# Patient Record
Sex: MALE | Race: WHITE | Employment: OTHER | URBAN - METROPOLITAN AREA
[De-identification: names, ages, dates, MRNs, and addresses within clinical notes are randomized per-mention and may not be internally consistent; named-entity substitution may affect disease eponyms.]

---

## 2017-02-20 RX ORDER — METFORMIN HYDROCHLORIDE 500 MG/1
TABLET, EXTENDED RELEASE ORAL
Qty: 60 TABLET | Refills: 11 | Status: SHIPPED | OUTPATIENT
Start: 2017-02-20 | End: 2018-02-19 | Stop reason: SDUPTHER

## 2017-02-20 RX ORDER — GUAIFENESIN AND CODEINE PHOSPHATE 100; 10 MG/5ML; MG/5ML
SOLUTION ORAL
Qty: 120 ML | Refills: 0 | Status: SHIPPED | OUTPATIENT
Start: 2017-02-20 | End: 2018-10-04 | Stop reason: ALTCHOICE

## 2017-02-27 RX ORDER — ATORVASTATIN CALCIUM 40 MG/1
40 TABLET, FILM COATED ORAL DAILY
Qty: 90 TABLET | Refills: 3 | Status: SHIPPED | OUTPATIENT
Start: 2017-02-27 | End: 2018-04-05 | Stop reason: SDUPTHER

## 2017-03-23 RX ORDER — POTASSIUM CHLORIDE 1500 MG/1
TABLET, EXTENDED RELEASE ORAL
Qty: 30 TABLET | Refills: 3 | Status: SHIPPED | OUTPATIENT
Start: 2017-03-23 | End: 2017-05-17 | Stop reason: DRUGHIGH

## 2017-04-12 RX ORDER — LORAZEPAM 0.5 MG/1
TABLET ORAL
Qty: 100 TABLET | Refills: 0 | Status: SHIPPED | OUTPATIENT
Start: 2017-04-12 | End: 2017-07-19 | Stop reason: SDUPTHER

## 2017-04-21 ENCOUNTER — TELEPHONE (OUTPATIENT)
Dept: INTERNAL MEDICINE | Age: 72
End: 2017-04-21

## 2017-05-17 ENCOUNTER — OFFICE VISIT (OUTPATIENT)
Dept: INTERNAL MEDICINE | Age: 72
End: 2017-05-17

## 2017-05-17 VITALS
DIASTOLIC BLOOD PRESSURE: 68 MMHG | SYSTOLIC BLOOD PRESSURE: 138 MMHG | HEART RATE: 56 BPM | TEMPERATURE: 98.2 F | RESPIRATION RATE: 16 BRPM | BODY MASS INDEX: 25.5 KG/M2 | WEIGHT: 188 LBS

## 2017-05-17 DIAGNOSIS — I25.10 CORONARY ARTERY DISEASE INVOLVING NATIVE CORONARY ARTERY OF NATIVE HEART WITHOUT ANGINA PECTORIS: ICD-10-CM

## 2017-05-17 DIAGNOSIS — Z91.81 AT HIGH RISK FOR FALLS: ICD-10-CM

## 2017-05-17 DIAGNOSIS — J43.2 CENTRILOBULAR EMPHYSEMA (HCC): ICD-10-CM

## 2017-05-17 DIAGNOSIS — F41.1 GENERALIZED ANXIETY DISORDER: ICD-10-CM

## 2017-05-17 DIAGNOSIS — E78.2 MIXED HYPERLIPIDEMIA: ICD-10-CM

## 2017-05-17 DIAGNOSIS — I10 ESSENTIAL HYPERTENSION, BENIGN: ICD-10-CM

## 2017-05-17 DIAGNOSIS — E11.42 TYPE 2 DIABETES MELLITUS WITH PERIPHERAL NEUROPATHY (HCC): Primary | ICD-10-CM

## 2017-05-17 LAB
ALBUMIN SERPL-MCNC: 4.2 G/DL (ref 3.4–5)
ALP BLD-CCNC: 62 U/L (ref 40–129)
ALT SERPL-CCNC: 25 U/L (ref 10–40)
ANION GAP SERPL CALCULATED.3IONS-SCNC: 13 MMOL/L (ref 3–16)
AST SERPL-CCNC: 22 U/L (ref 15–37)
BASOPHILS ABSOLUTE: 0 K/UL (ref 0–0.2)
BASOPHILS RELATIVE PERCENT: 0.6 %
BILIRUB SERPL-MCNC: 0.7 MG/DL (ref 0–1)
BILIRUBIN DIRECT: <0.2 MG/DL (ref 0–0.3)
BILIRUBIN, INDIRECT: ABNORMAL MG/DL (ref 0–1)
BUN BLDV-MCNC: 20 MG/DL (ref 7–20)
CALCIUM SERPL-MCNC: 9.5 MG/DL (ref 8.3–10.6)
CHLORIDE BLD-SCNC: 101 MMOL/L (ref 99–110)
CHOLESTEROL, TOTAL: 137 MG/DL (ref 0–199)
CO2: 24 MMOL/L (ref 21–32)
CREAT SERPL-MCNC: 1 MG/DL (ref 0.8–1.3)
CREATININE URINE: 102.9 MG/DL (ref 39–259)
EOSINOPHILS ABSOLUTE: 0.1 K/UL (ref 0–0.6)
EOSINOPHILS RELATIVE PERCENT: 2.2 %
GFR AFRICAN AMERICAN: >60
GFR NON-AFRICAN AMERICAN: >60
GLUCOSE BLD-MCNC: 126 MG/DL (ref 70–99)
HCT VFR BLD CALC: 38.4 % (ref 40.5–52.5)
HDLC SERPL-MCNC: 49 MG/DL (ref 40–60)
HEMOGLOBIN: 12.6 G/DL (ref 13.5–17.5)
LDL CHOLESTEROL CALCULATED: 63 MG/DL
LYMPHOCYTES ABSOLUTE: 1.7 K/UL (ref 1–5.1)
LYMPHOCYTES RELATIVE PERCENT: 25.2 %
MCH RBC QN AUTO: 30.2 PG (ref 26–34)
MCHC RBC AUTO-ENTMCNC: 32.8 G/DL (ref 31–36)
MCV RBC AUTO: 92.3 FL (ref 80–100)
MICROALBUMIN UR-MCNC: <1.2 MG/DL
MICROALBUMIN/CREAT UR-RTO: NORMAL MG/G (ref 0–30)
MONOCYTES ABSOLUTE: 0.4 K/UL (ref 0–1.3)
MONOCYTES RELATIVE PERCENT: 6.7 %
NEUTROPHILS ABSOLUTE: 4.4 K/UL (ref 1.7–7.7)
NEUTROPHILS RELATIVE PERCENT: 65.3 %
PDW BLD-RTO: 14.7 % (ref 12.4–15.4)
PLATELET # BLD: 111 K/UL (ref 135–450)
PMV BLD AUTO: 10.9 FL (ref 5–10.5)
POTASSIUM SERPL-SCNC: 4.4 MMOL/L (ref 3.5–5.1)
RBC # BLD: 4.16 M/UL (ref 4.2–5.9)
SODIUM BLD-SCNC: 138 MMOL/L (ref 136–145)
TOTAL PROTEIN: 6.3 G/DL (ref 6.4–8.2)
TRIGL SERPL-MCNC: 126 MG/DL (ref 0–150)
VLDLC SERPL CALC-MCNC: 25 MG/DL
WBC # BLD: 6.7 K/UL (ref 4–11)

## 2017-05-17 PROCEDURE — 3023F SPIROM DOC REV: CPT | Performed by: INTERNAL MEDICINE

## 2017-05-17 PROCEDURE — G8926 SPIRO NO PERF OR DOC: HCPCS | Performed by: INTERNAL MEDICINE

## 2017-05-17 PROCEDURE — G8420 CALC BMI NORM PARAMETERS: HCPCS | Performed by: INTERNAL MEDICINE

## 2017-05-17 PROCEDURE — 99214 OFFICE O/P EST MOD 30 MIN: CPT | Performed by: INTERNAL MEDICINE

## 2017-05-17 PROCEDURE — 1036F TOBACCO NON-USER: CPT | Performed by: INTERNAL MEDICINE

## 2017-05-17 PROCEDURE — G8598 ASA/ANTIPLAT THER USED: HCPCS | Performed by: INTERNAL MEDICINE

## 2017-05-17 PROCEDURE — 3044F HG A1C LEVEL LT 7.0%: CPT | Performed by: INTERNAL MEDICINE

## 2017-05-17 PROCEDURE — 4040F PNEUMOC VAC/ADMIN/RCVD: CPT | Performed by: INTERNAL MEDICINE

## 2017-05-17 PROCEDURE — 36415 COLL VENOUS BLD VENIPUNCTURE: CPT | Performed by: INTERNAL MEDICINE

## 2017-05-17 PROCEDURE — G8427 DOCREV CUR MEDS BY ELIG CLIN: HCPCS | Performed by: INTERNAL MEDICINE

## 2017-05-17 PROCEDURE — 1123F ACP DISCUSS/DSCN MKR DOCD: CPT | Performed by: INTERNAL MEDICINE

## 2017-05-17 PROCEDURE — 3017F COLORECTAL CA SCREEN DOC REV: CPT | Performed by: INTERNAL MEDICINE

## 2017-05-17 RX ORDER — TAMSULOSIN HYDROCHLORIDE 0.4 MG/1
CAPSULE ORAL
COMMUNITY
End: 2017-05-17 | Stop reason: DRUGHIGH

## 2017-05-17 RX ORDER — POTASSIUM CHLORIDE 20 MEQ/1
TABLET, EXTENDED RELEASE ORAL
COMMUNITY
End: 2018-01-16 | Stop reason: SDUPTHER

## 2017-05-17 RX ORDER — FUROSEMIDE 20 MG/1
TABLET ORAL
COMMUNITY
End: 2017-10-14 | Stop reason: SDUPTHER

## 2017-05-17 ASSESSMENT — PATIENT HEALTH QUESTIONNAIRE - PHQ9
SUM OF ALL RESPONSES TO PHQ9 QUESTIONS 1 & 2: 0
1. LITTLE INTEREST OR PLEASURE IN DOING THINGS: 0
SUM OF ALL RESPONSES TO PHQ QUESTIONS 1-9: 0
2. FEELING DOWN, DEPRESSED OR HOPELESS: 0

## 2017-05-18 LAB
ESTIMATED AVERAGE GLUCOSE: 139.9 MG/DL
HBA1C MFR BLD: 6.5 %

## 2017-06-20 RX ORDER — LOSARTAN POTASSIUM 100 MG/1
TABLET ORAL
Qty: 90 TABLET | Refills: 1 | Status: SHIPPED | OUTPATIENT
Start: 2017-06-20 | End: 2017-12-20 | Stop reason: SDUPTHER

## 2017-07-27 ENCOUNTER — OFFICE VISIT (OUTPATIENT)
Dept: CARDIOLOGY CLINIC | Age: 72
End: 2017-07-27

## 2017-07-27 VITALS
DIASTOLIC BLOOD PRESSURE: 80 MMHG | SYSTOLIC BLOOD PRESSURE: 124 MMHG | WEIGHT: 184.8 LBS | HEART RATE: 63 BPM | BODY MASS INDEX: 25.06 KG/M2

## 2017-07-27 DIAGNOSIS — I10 ESSENTIAL HYPERTENSION, BENIGN: Primary | ICD-10-CM

## 2017-07-27 DIAGNOSIS — D69.6 THROMBOCYTOPENIA (HCC): ICD-10-CM

## 2017-07-27 DIAGNOSIS — I25.10 CORONARY ARTERY DISEASE INVOLVING NATIVE CORONARY ARTERY OF NATIVE HEART WITHOUT ANGINA PECTORIS: ICD-10-CM

## 2017-07-27 DIAGNOSIS — N52.9 VASCULOGENIC ERECTILE DYSFUNCTION, UNSPECIFIED VASCULOGENIC ERECTILE DYSFUNCTION TYPE: ICD-10-CM

## 2017-07-27 PROCEDURE — G8419 CALC BMI OUT NRM PARAM NOF/U: HCPCS | Performed by: INTERNAL MEDICINE

## 2017-07-27 PROCEDURE — G8598 ASA/ANTIPLAT THER USED: HCPCS | Performed by: INTERNAL MEDICINE

## 2017-07-27 PROCEDURE — G8427 DOCREV CUR MEDS BY ELIG CLIN: HCPCS | Performed by: INTERNAL MEDICINE

## 2017-07-27 PROCEDURE — 99214 OFFICE O/P EST MOD 30 MIN: CPT | Performed by: INTERNAL MEDICINE

## 2017-07-27 PROCEDURE — 1036F TOBACCO NON-USER: CPT | Performed by: INTERNAL MEDICINE

## 2017-07-27 PROCEDURE — 93000 ELECTROCARDIOGRAM COMPLETE: CPT | Performed by: INTERNAL MEDICINE

## 2017-07-27 PROCEDURE — 1123F ACP DISCUSS/DSCN MKR DOCD: CPT | Performed by: INTERNAL MEDICINE

## 2017-07-27 PROCEDURE — 4040F PNEUMOC VAC/ADMIN/RCVD: CPT | Performed by: INTERNAL MEDICINE

## 2017-07-27 PROCEDURE — 3017F COLORECTAL CA SCREEN DOC REV: CPT | Performed by: INTERNAL MEDICINE

## 2017-07-27 RX ORDER — SILDENAFIL 100 MG/1
100 TABLET, FILM COATED ORAL PRN
Qty: 10 TABLET | Refills: 5 | Status: SHIPPED | OUTPATIENT
Start: 2017-07-27 | End: 2019-10-01

## 2017-09-11 ENCOUNTER — OFFICE VISIT (OUTPATIENT)
Dept: INTERNAL MEDICINE | Age: 72
End: 2017-09-11

## 2017-09-11 VITALS
RESPIRATION RATE: 16 BRPM | DIASTOLIC BLOOD PRESSURE: 70 MMHG | HEART RATE: 60 BPM | WEIGHT: 187 LBS | BODY MASS INDEX: 25.36 KG/M2 | SYSTOLIC BLOOD PRESSURE: 132 MMHG | TEMPERATURE: 98.2 F

## 2017-09-11 DIAGNOSIS — E11.42 TYPE 2 DIABETES MELLITUS WITH PERIPHERAL NEUROPATHY (HCC): ICD-10-CM

## 2017-09-11 DIAGNOSIS — I10 ESSENTIAL HYPERTENSION, BENIGN: Primary | ICD-10-CM

## 2017-09-11 DIAGNOSIS — F41.1 GENERALIZED ANXIETY DISORDER: ICD-10-CM

## 2017-09-11 DIAGNOSIS — E78.2 MIXED HYPERLIPIDEMIA: ICD-10-CM

## 2017-09-11 DIAGNOSIS — I25.10 CORONARY ARTERY DISEASE INVOLVING NATIVE CORONARY ARTERY OF NATIVE HEART WITHOUT ANGINA PECTORIS: ICD-10-CM

## 2017-09-11 LAB
ALBUMIN SERPL-MCNC: 4.3 G/DL (ref 3.4–5)
ALP BLD-CCNC: 59 U/L (ref 40–129)
ALT SERPL-CCNC: 20 U/L (ref 10–40)
ANION GAP SERPL CALCULATED.3IONS-SCNC: 15 MMOL/L (ref 3–16)
AST SERPL-CCNC: 21 U/L (ref 15–37)
BASOPHILS ABSOLUTE: 0 K/UL (ref 0–0.2)
BASOPHILS RELATIVE PERCENT: 0.6 %
BILIRUB SERPL-MCNC: 0.6 MG/DL (ref 0–1)
BILIRUBIN DIRECT: <0.2 MG/DL (ref 0–0.3)
BILIRUBIN, INDIRECT: NORMAL MG/DL (ref 0–1)
BUN BLDV-MCNC: 16 MG/DL (ref 7–20)
CALCIUM SERPL-MCNC: 9.8 MG/DL (ref 8.3–10.6)
CHLORIDE BLD-SCNC: 102 MMOL/L (ref 99–110)
CHOLESTEROL, TOTAL: 164 MG/DL (ref 0–199)
CO2: 23 MMOL/L (ref 21–32)
CREAT SERPL-MCNC: 0.8 MG/DL (ref 0.8–1.3)
EOSINOPHILS ABSOLUTE: 0.1 K/UL (ref 0–0.6)
EOSINOPHILS RELATIVE PERCENT: 1.9 %
GFR AFRICAN AMERICAN: >60
GFR NON-AFRICAN AMERICAN: >60
GLUCOSE BLD-MCNC: 143 MG/DL (ref 70–99)
HCT VFR BLD CALC: 39.1 % (ref 40.5–52.5)
HDLC SERPL-MCNC: 49 MG/DL (ref 40–60)
HEMOGLOBIN: 13.4 G/DL (ref 13.5–17.5)
LDL CHOLESTEROL CALCULATED: 76 MG/DL
LYMPHOCYTES ABSOLUTE: 1.5 K/UL (ref 1–5.1)
LYMPHOCYTES RELATIVE PERCENT: 21.9 %
MCH RBC QN AUTO: 31.3 PG (ref 26–34)
MCHC RBC AUTO-ENTMCNC: 34.3 G/DL (ref 31–36)
MCV RBC AUTO: 91.2 FL (ref 80–100)
MONOCYTES ABSOLUTE: 0.5 K/UL (ref 0–1.3)
MONOCYTES RELATIVE PERCENT: 6.7 %
NEUTROPHILS ABSOLUTE: 4.9 K/UL (ref 1.7–7.7)
NEUTROPHILS RELATIVE PERCENT: 68.9 %
PDW BLD-RTO: 13.7 % (ref 12.4–15.4)
PLATELET # BLD: 115 K/UL (ref 135–450)
PMV BLD AUTO: 10.6 FL (ref 5–10.5)
POTASSIUM SERPL-SCNC: 4.2 MMOL/L (ref 3.5–5.1)
RBC # BLD: 4.29 M/UL (ref 4.2–5.9)
SODIUM BLD-SCNC: 140 MMOL/L (ref 136–145)
TOTAL PROTEIN: 6.7 G/DL (ref 6.4–8.2)
TRIGL SERPL-MCNC: 195 MG/DL (ref 0–150)
VLDLC SERPL CALC-MCNC: 39 MG/DL
WBC # BLD: 7.1 K/UL (ref 4–11)

## 2017-09-11 PROCEDURE — 36415 COLL VENOUS BLD VENIPUNCTURE: CPT | Performed by: INTERNAL MEDICINE

## 2017-09-11 PROCEDURE — 1036F TOBACCO NON-USER: CPT | Performed by: INTERNAL MEDICINE

## 2017-09-11 PROCEDURE — G8419 CALC BMI OUT NRM PARAM NOF/U: HCPCS | Performed by: INTERNAL MEDICINE

## 2017-09-11 PROCEDURE — G8427 DOCREV CUR MEDS BY ELIG CLIN: HCPCS | Performed by: INTERNAL MEDICINE

## 2017-09-11 PROCEDURE — 1123F ACP DISCUSS/DSCN MKR DOCD: CPT | Performed by: INTERNAL MEDICINE

## 2017-09-11 PROCEDURE — 3017F COLORECTAL CA SCREEN DOC REV: CPT | Performed by: INTERNAL MEDICINE

## 2017-09-11 PROCEDURE — G8598 ASA/ANTIPLAT THER USED: HCPCS | Performed by: INTERNAL MEDICINE

## 2017-09-11 PROCEDURE — 4040F PNEUMOC VAC/ADMIN/RCVD: CPT | Performed by: INTERNAL MEDICINE

## 2017-09-11 PROCEDURE — 3046F HEMOGLOBIN A1C LEVEL >9.0%: CPT | Performed by: INTERNAL MEDICINE

## 2017-09-11 PROCEDURE — 99214 OFFICE O/P EST MOD 30 MIN: CPT | Performed by: INTERNAL MEDICINE

## 2017-09-11 RX ORDER — LORAZEPAM 0.5 MG/1
0.5 TABLET ORAL EVERY 8 HOURS PRN
Qty: 100 TABLET | Refills: 0 | Status: SHIPPED | OUTPATIENT
Start: 2017-09-11 | End: 2017-10-23 | Stop reason: SDUPTHER

## 2017-09-12 LAB
ESTIMATED AVERAGE GLUCOSE: 139.9 MG/DL
HBA1C MFR BLD: 6.5 %

## 2017-10-16 RX ORDER — FUROSEMIDE 20 MG/1
TABLET ORAL
Qty: 30 TABLET | Refills: 5 | Status: SHIPPED | OUTPATIENT
Start: 2017-10-16 | End: 2018-04-16 | Stop reason: SDUPTHER

## 2017-10-18 RX ORDER — SITAGLIPTIN 100 MG/1
TABLET, FILM COATED ORAL
Qty: 30 TABLET | Refills: 2 | Status: SHIPPED | OUTPATIENT
Start: 2017-10-18 | End: 2018-03-04 | Stop reason: SDUPTHER

## 2017-10-23 ENCOUNTER — OFFICE VISIT (OUTPATIENT)
Dept: INTERNAL MEDICINE CLINIC | Age: 72
End: 2017-10-23

## 2017-10-23 VITALS
SYSTOLIC BLOOD PRESSURE: 128 MMHG | DIASTOLIC BLOOD PRESSURE: 66 MMHG | HEART RATE: 68 BPM | RESPIRATION RATE: 16 BRPM | WEIGHT: 191 LBS | BODY MASS INDEX: 27.35 KG/M2 | HEIGHT: 70 IN

## 2017-10-23 DIAGNOSIS — J43.2 CENTRILOBULAR EMPHYSEMA (HCC): ICD-10-CM

## 2017-10-23 DIAGNOSIS — E11.42 TYPE 2 DIABETES MELLITUS WITH PERIPHERAL NEUROPATHY (HCC): ICD-10-CM

## 2017-10-23 DIAGNOSIS — E78.2 MIXED HYPERLIPIDEMIA: ICD-10-CM

## 2017-10-23 DIAGNOSIS — K21.9 GASTROESOPHAGEAL REFLUX DISEASE WITHOUT ESOPHAGITIS: ICD-10-CM

## 2017-10-23 DIAGNOSIS — I10 ESSENTIAL HYPERTENSION, BENIGN: Primary | ICD-10-CM

## 2017-10-23 DIAGNOSIS — F41.1 GENERALIZED ANXIETY DISORDER: ICD-10-CM

## 2017-10-23 DIAGNOSIS — K58.0 IRRITABLE BOWEL SYNDROME WITH DIARRHEA: ICD-10-CM

## 2017-10-23 DIAGNOSIS — I25.10 CORONARY ARTERY DISEASE INVOLVING NATIVE CORONARY ARTERY OF NATIVE HEART WITHOUT ANGINA PECTORIS: ICD-10-CM

## 2017-10-23 PROCEDURE — G8427 DOCREV CUR MEDS BY ELIG CLIN: HCPCS | Performed by: INTERNAL MEDICINE

## 2017-10-23 PROCEDURE — G8926 SPIRO NO PERF OR DOC: HCPCS | Performed by: INTERNAL MEDICINE

## 2017-10-23 PROCEDURE — 4040F PNEUMOC VAC/ADMIN/RCVD: CPT | Performed by: INTERNAL MEDICINE

## 2017-10-23 PROCEDURE — 1036F TOBACCO NON-USER: CPT | Performed by: INTERNAL MEDICINE

## 2017-10-23 PROCEDURE — G8484 FLU IMMUNIZE NO ADMIN: HCPCS | Performed by: INTERNAL MEDICINE

## 2017-10-23 PROCEDURE — G8417 CALC BMI ABV UP PARAM F/U: HCPCS | Performed by: INTERNAL MEDICINE

## 2017-10-23 PROCEDURE — 3023F SPIROM DOC REV: CPT | Performed by: INTERNAL MEDICINE

## 2017-10-23 PROCEDURE — 90662 IIV NO PRSV INCREASED AG IM: CPT | Performed by: INTERNAL MEDICINE

## 2017-10-23 PROCEDURE — 99214 OFFICE O/P EST MOD 30 MIN: CPT | Performed by: INTERNAL MEDICINE

## 2017-10-23 PROCEDURE — 3017F COLORECTAL CA SCREEN DOC REV: CPT | Performed by: INTERNAL MEDICINE

## 2017-10-23 PROCEDURE — G0008 ADMIN INFLUENZA VIRUS VAC: HCPCS | Performed by: INTERNAL MEDICINE

## 2017-10-23 PROCEDURE — 1123F ACP DISCUSS/DSCN MKR DOCD: CPT | Performed by: INTERNAL MEDICINE

## 2017-10-23 PROCEDURE — G8598 ASA/ANTIPLAT THER USED: HCPCS | Performed by: INTERNAL MEDICINE

## 2017-10-23 PROCEDURE — 3044F HG A1C LEVEL LT 7.0%: CPT | Performed by: INTERNAL MEDICINE

## 2017-10-23 RX ORDER — LORAZEPAM 0.5 MG/1
0.5 TABLET ORAL EVERY 8 HOURS PRN
Qty: 90 TABLET | Refills: 2 | Status: SHIPPED | OUTPATIENT
Start: 2017-10-23 | End: 2018-04-16 | Stop reason: SDUPTHER

## 2017-10-23 ASSESSMENT — ENCOUNTER SYMPTOMS
COUGH: 0
GASTROINTESTINAL NEGATIVE: 1
RESPIRATORY NEGATIVE: 1
SHORTNESS OF BREATH: 0

## 2017-10-23 NOTE — PROGRESS NOTES
Vaccine Information Sheet, \"Influenza - Inactivated\"  given to Arline Lee, or parent/legal guardian of  Arline Lee and verbalized understanding. Patient responses:    Have you ever had a reaction to a flu vaccine? No  Are you able to eat eggs without adverse effects? Yes  Do you have any current illness? No  Have you ever had Guillian [place] Syndrome? No    Flu vaccine given per order. Please see immunization tab.

## 2017-10-23 NOTE — LETTER
and sex drive. They may cause problems with breathing, sleep apnea and reduced coughing, which are especially dangerous for patients with lung disease. Overdose or dangerous interactions with alcohol and other medications may occur, leading to death. Hyperalgesia may develop, in which patients receiving opioids for the treatment of pain may actually become more sensitive to certain painful stimuli, and in some cases, experience pain from ordinarily non-painful stimuli. Women between the ages of 14-53 who could become pregnant should carefully weigh the risks and benefits of opioids with their physicians, as these medications increase the risk of pregnancy complications, including miscarriage,  delivery and stillbirth. It is also possible for babies to be born addicted to opioids. Opioid dependence withdrawal symptoms may include; feelings of uneasiness, increased pain, irritability, belly pain, diarrhea, sweats and goose-flesh. Benzodiazepines and non-benzodiazepine sleep medications: These medications can lead to problems such as addiction/dependence, sedation, fatigue, lightheadedness, dizziness, incoordination, falls, depression, hallucinations, and impaired judgment, memory and concentration. The ability to drive and operate machinery may also be affected. Abnormal sleep-related behaviors have been reported, including sleep walking, driving, making telephone calls, eating, or having sex while not fully awake. These medications can suppress breathing and worsen sleep apnea, particularly when combined with alcohol or other sedating medications, potentially leading to death. Dependence withdrawal symptoms may include tremors, anxiety, hallucinations and seizures. Stimulants:  Common adverse effects include addiction/dependence, increased blood pressure and heart rate, decreased appetite, nausea, involuntary weight loss, insomnia, irritability, and headaches.   These · I develop significant side effects from the medication. · My behavior is inconsistent with the responsibilities outlined above, which may also result in my being prevented from receiving further care from this office. · Other:____________________________________________________________________    AGREEMENT:    I have read the above and have had all of my questions answered. For chronic disease management, I know that my symptoms can be managed with many types of treatments. A chronic medication trial may be part of my treatment, but I must be an active participant in my care. Medication therapy is only one part of my symptom management plan. In some cases, there may be limited scientific evidence to support the chronic use of certain medications to improve symptoms and daily function. Furthermore, in certain circumstances, there may be scientific information that suggests that use of chronic controlled substances may actually worsen my symptoms and increase my risk of unintentional death directly related to this medication therapy. I know that if my provider feels my risk from controlled medications is greater than my benefit, I will have my controlled substance medication(s) compassionately lowered or removed altogether. I agree to a controlled substance medication trial.      I further agree to allow this office to contact family or friends if there are concerns about my safety and use of the controlled medications. I have agreed to use the following medications above as instructed by my physician and as stated in this [address].      Patient Signature:  ______________________  Date:10/23/2017 or _____________    Provider Signature:______________________  Date:10/23/2017 or _____________

## 2017-10-23 NOTE — LETTER
· My behavior is inconsistent with the responsibilities outlined above, which may also result in my being prevented from receiving further care from this office. · Other:____________________________________________________________________    AGREEMENT:    I have read the above and have had all of my questions answered. For chronic disease management, I know that my symptoms can be managed with many types of treatments. A chronic medication trial may be part of my treatment, but I must be an active participant in my care. Medication therapy is only one part of my symptom management plan. In some cases, there may be limited scientific evidence to support the chronic use of certain medications to improve symptoms and daily function. Furthermore, in certain circumstances, there may be scientific information that suggests that use of chronic controlled substances may actually worsen my symptoms and increase my risk of unintentional death directly related to this medication therapy. I know that if my provider feels my risk from controlled medications is greater than my benefit, I will have my controlled substance medication(s) compassionately lowered or removed altogether. I agree to a controlled substance medication trial.      I further agree to allow this office to contact family or friends if there are concerns about my safety and use of the controlled medications. I have agreed to use the following medications above as instructed by my physician and as stated in this [address].      Patient Signature:  ______________________  Date:10/23/2017 or _____________    Provider Signature:______________________  Date:10/23/2017 or _____________

## 2017-12-20 RX ORDER — LOSARTAN POTASSIUM 100 MG/1
TABLET ORAL
Qty: 90 TABLET | Refills: 1 | Status: SHIPPED | OUTPATIENT
Start: 2017-12-20 | End: 2018-04-16 | Stop reason: SDUPTHER

## 2018-01-15 RX ORDER — POTASSIUM CHLORIDE 20 MEQ/1
20 TABLET, EXTENDED RELEASE ORAL DAILY
Qty: 60 TABLET | Refills: 3 | OUTPATIENT
Start: 2018-01-15

## 2018-01-19 RX ORDER — POTASSIUM CHLORIDE 20 MEQ/1
TABLET, EXTENDED RELEASE ORAL
Qty: 30 TABLET | Refills: 3 | Status: SHIPPED | OUTPATIENT
Start: 2018-01-19 | End: 2018-04-16 | Stop reason: SDUPTHER

## 2018-01-22 RX ORDER — POTASSIUM CHLORIDE 1500 MG/1
TABLET, EXTENDED RELEASE ORAL
Qty: 30 TABLET | Refills: 3 | OUTPATIENT
Start: 2018-01-22

## 2018-02-19 RX ORDER — METFORMIN HYDROCHLORIDE 500 MG/1
TABLET, EXTENDED RELEASE ORAL
Qty: 180 TABLET | Refills: 2 | Status: SHIPPED | OUTPATIENT
Start: 2018-02-19 | End: 2018-10-16 | Stop reason: SDUPTHER

## 2018-03-05 RX ORDER — SITAGLIPTIN 100 MG/1
TABLET, FILM COATED ORAL
Qty: 30 TABLET | Refills: 5 | Status: SHIPPED | OUTPATIENT
Start: 2018-03-05 | End: 2018-09-20 | Stop reason: SDUPTHER

## 2018-04-05 ENCOUNTER — OFFICE VISIT (OUTPATIENT)
Dept: CARDIOLOGY CLINIC | Age: 73
End: 2018-04-05

## 2018-04-05 VITALS
SYSTOLIC BLOOD PRESSURE: 150 MMHG | DIASTOLIC BLOOD PRESSURE: 92 MMHG | WEIGHT: 192 LBS | HEART RATE: 64 BPM | BODY MASS INDEX: 27.55 KG/M2

## 2018-04-05 DIAGNOSIS — E78.2 MIXED HYPERLIPIDEMIA: Primary | ICD-10-CM

## 2018-04-05 DIAGNOSIS — I25.10 CORONARY ARTERY DISEASE INVOLVING NATIVE CORONARY ARTERY OF NATIVE HEART WITHOUT ANGINA PECTORIS: ICD-10-CM

## 2018-04-05 DIAGNOSIS — I10 ESSENTIAL HYPERTENSION, BENIGN: ICD-10-CM

## 2018-04-05 PROCEDURE — 3017F COLORECTAL CA SCREEN DOC REV: CPT | Performed by: INTERNAL MEDICINE

## 2018-04-05 PROCEDURE — 99213 OFFICE O/P EST LOW 20 MIN: CPT | Performed by: INTERNAL MEDICINE

## 2018-04-05 PROCEDURE — 1123F ACP DISCUSS/DSCN MKR DOCD: CPT | Performed by: INTERNAL MEDICINE

## 2018-04-05 PROCEDURE — G8417 CALC BMI ABV UP PARAM F/U: HCPCS | Performed by: INTERNAL MEDICINE

## 2018-04-05 PROCEDURE — G8427 DOCREV CUR MEDS BY ELIG CLIN: HCPCS | Performed by: INTERNAL MEDICINE

## 2018-04-05 PROCEDURE — 1036F TOBACCO NON-USER: CPT | Performed by: INTERNAL MEDICINE

## 2018-04-05 PROCEDURE — G8598 ASA/ANTIPLAT THER USED: HCPCS | Performed by: INTERNAL MEDICINE

## 2018-04-05 PROCEDURE — 4040F PNEUMOC VAC/ADMIN/RCVD: CPT | Performed by: INTERNAL MEDICINE

## 2018-04-05 RX ORDER — ATORVASTATIN CALCIUM 40 MG/1
40 TABLET, FILM COATED ORAL DAILY
Qty: 90 TABLET | Refills: 3 | Status: SHIPPED | OUTPATIENT
Start: 2018-04-05 | End: 2019-04-04 | Stop reason: SDUPTHER

## 2018-04-13 ENCOUNTER — HOSPITAL ENCOUNTER (OUTPATIENT)
Dept: NON INVASIVE DIAGNOSTICS | Age: 73
Discharge: HOME | End: 2018-04-13
Attending: INTERNAL MEDICINE | Admitting: INTERNAL MEDICINE

## 2018-04-13 DIAGNOSIS — E78.2 MIXED HYPERLIPIDEMIA: ICD-10-CM

## 2018-04-13 LAB
A/G RATIO: 1.9 (ref 1.1–2.2)
ALBUMIN SERPL-MCNC: 4.1 G/DL (ref 3.4–5)
ALP BLD-CCNC: 64 U/L (ref 40–129)
ALT SERPL-CCNC: 30 U/L (ref 10–40)
ANION GAP SERPL CALCULATED.3IONS-SCNC: 14 MMOL/L (ref 3–16)
AST SERPL-CCNC: 23 U/L (ref 15–37)
BILIRUB SERPL-MCNC: 0.6 MG/DL (ref 0–1)
BUN BLDV-MCNC: 14 MG/DL (ref 7–20)
CALCIUM SERPL-MCNC: 9.4 MG/DL (ref 8.3–10.6)
CHLORIDE BLD-SCNC: 101 MMOL/L (ref 99–110)
CHOLESTEROL, TOTAL: 133 MG/DL (ref 0–199)
CO2: 26 MMOL/L (ref 21–32)
CREAT SERPL-MCNC: 0.8 MG/DL (ref 0.8–1.3)
GFR AFRICAN AMERICAN: >60
GFR NON-AFRICAN AMERICAN: >60
GLOBULIN: 2.2 G/DL
GLUCOSE BLD-MCNC: 166 MG/DL (ref 70–99)
HDLC SERPL-MCNC: 45 MG/DL (ref 40–60)
LDL CHOLESTEROL CALCULATED: 57 MG/DL
LV EF: 58 %
LVEF MODALITY: NORMAL
POTASSIUM SERPL-SCNC: 4.5 MMOL/L (ref 3.5–5.1)
SODIUM BLD-SCNC: 141 MMOL/L (ref 136–145)
TOTAL PROTEIN: 6.3 G/DL (ref 6.4–8.2)
TRIGL SERPL-MCNC: 153 MG/DL (ref 0–150)
VLDLC SERPL CALC-MCNC: 31 MG/DL

## 2018-04-16 ENCOUNTER — OFFICE VISIT (OUTPATIENT)
Dept: INTERNAL MEDICINE CLINIC | Age: 73
End: 2018-04-16

## 2018-04-16 VITALS
BODY MASS INDEX: 27.12 KG/M2 | DIASTOLIC BLOOD PRESSURE: 82 MMHG | HEART RATE: 60 BPM | SYSTOLIC BLOOD PRESSURE: 138 MMHG | WEIGHT: 189 LBS | RESPIRATION RATE: 16 BRPM | TEMPERATURE: 98.1 F

## 2018-04-16 DIAGNOSIS — J43.2 CENTRILOBULAR EMPHYSEMA (HCC): ICD-10-CM

## 2018-04-16 DIAGNOSIS — I10 ESSENTIAL HYPERTENSION, BENIGN: Primary | ICD-10-CM

## 2018-04-16 DIAGNOSIS — E11.42 TYPE 2 DIABETES MELLITUS WITH PERIPHERAL NEUROPATHY (HCC): ICD-10-CM

## 2018-04-16 DIAGNOSIS — I25.10 CORONARY ARTERY DISEASE INVOLVING NATIVE CORONARY ARTERY OF NATIVE HEART WITHOUT ANGINA PECTORIS: ICD-10-CM

## 2018-04-16 DIAGNOSIS — N40.1 BENIGN PROSTATIC HYPERPLASIA WITH NOCTURIA: ICD-10-CM

## 2018-04-16 DIAGNOSIS — R35.1 BENIGN PROSTATIC HYPERPLASIA WITH NOCTURIA: ICD-10-CM

## 2018-04-16 PROCEDURE — 1123F ACP DISCUSS/DSCN MKR DOCD: CPT | Performed by: INTERNAL MEDICINE

## 2018-04-16 PROCEDURE — G8926 SPIRO NO PERF OR DOC: HCPCS | Performed by: INTERNAL MEDICINE

## 2018-04-16 PROCEDURE — G8598 ASA/ANTIPLAT THER USED: HCPCS | Performed by: INTERNAL MEDICINE

## 2018-04-16 PROCEDURE — G8417 CALC BMI ABV UP PARAM F/U: HCPCS | Performed by: INTERNAL MEDICINE

## 2018-04-16 PROCEDURE — 3017F COLORECTAL CA SCREEN DOC REV: CPT | Performed by: INTERNAL MEDICINE

## 2018-04-16 PROCEDURE — 1036F TOBACCO NON-USER: CPT | Performed by: INTERNAL MEDICINE

## 2018-04-16 PROCEDURE — 4040F PNEUMOC VAC/ADMIN/RCVD: CPT | Performed by: INTERNAL MEDICINE

## 2018-04-16 PROCEDURE — 2022F DILAT RTA XM EVC RTNOPTHY: CPT | Performed by: INTERNAL MEDICINE

## 2018-04-16 PROCEDURE — G8427 DOCREV CUR MEDS BY ELIG CLIN: HCPCS | Performed by: INTERNAL MEDICINE

## 2018-04-16 PROCEDURE — 99214 OFFICE O/P EST MOD 30 MIN: CPT | Performed by: INTERNAL MEDICINE

## 2018-04-16 PROCEDURE — 3023F SPIROM DOC REV: CPT | Performed by: INTERNAL MEDICINE

## 2018-04-16 PROCEDURE — 3046F HEMOGLOBIN A1C LEVEL >9.0%: CPT | Performed by: INTERNAL MEDICINE

## 2018-04-16 RX ORDER — FUROSEMIDE 40 MG/1
TABLET ORAL
Qty: 30 TABLET | Refills: 3 | Status: SHIPPED | OUTPATIENT
Start: 2018-04-16 | End: 2018-05-25

## 2018-04-16 RX ORDER — LORAZEPAM 0.5 MG/1
0.5 TABLET ORAL EVERY 8 HOURS PRN
Qty: 90 TABLET | Refills: 2 | Status: SHIPPED | OUTPATIENT
Start: 2018-04-16 | End: 2018-05-16

## 2018-04-16 RX ORDER — POTASSIUM CHLORIDE 20 MEQ/1
TABLET, EXTENDED RELEASE ORAL
Qty: 60 TABLET | Refills: 3 | Status: SHIPPED | OUTPATIENT
Start: 2018-04-16 | End: 2018-08-09 | Stop reason: SDUPTHER

## 2018-04-16 RX ORDER — LOSARTAN POTASSIUM 100 MG/1
50 TABLET ORAL DAILY
Qty: 30 TABLET | Refills: 3 | Status: SHIPPED | OUTPATIENT
Start: 2018-04-16 | End: 2018-05-25 | Stop reason: ALTCHOICE

## 2018-04-16 RX ORDER — ALBUTEROL SULFATE 90 UG/1
2 AEROSOL, METERED RESPIRATORY (INHALATION) 4 TIMES DAILY
COMMUNITY
End: 2019-10-01

## 2018-04-16 RX ORDER — TAMSULOSIN HYDROCHLORIDE 0.4 MG/1
0.4 CAPSULE ORAL 2 TIMES DAILY
Qty: 60 CAPSULE | Refills: 2 | Status: SHIPPED | OUTPATIENT
Start: 2018-04-16 | End: 2018-05-25 | Stop reason: DRUGHIGH

## 2018-04-16 RX ORDER — DOXYCYCLINE HYCLATE 100 MG/1
100 CAPSULE ORAL 2 TIMES DAILY
COMMUNITY
End: 2019-04-04

## 2018-04-18 ASSESSMENT — ENCOUNTER SYMPTOMS
GASTROINTESTINAL NEGATIVE: 1
RESPIRATORY NEGATIVE: 1
SHORTNESS OF BREATH: 0
COUGH: 0

## 2018-05-25 ENCOUNTER — OFFICE VISIT (OUTPATIENT)
Dept: INTERNAL MEDICINE CLINIC | Age: 73
End: 2018-05-25

## 2018-05-25 VITALS
BODY MASS INDEX: 27.12 KG/M2 | SYSTOLIC BLOOD PRESSURE: 116 MMHG | RESPIRATION RATE: 16 BRPM | DIASTOLIC BLOOD PRESSURE: 68 MMHG | WEIGHT: 189 LBS | HEART RATE: 60 BPM

## 2018-05-25 DIAGNOSIS — I50.32 CHRONIC DIASTOLIC CHF (CONGESTIVE HEART FAILURE), NYHA CLASS 2 (HCC): Primary | ICD-10-CM

## 2018-05-25 DIAGNOSIS — H81.10 BENIGN PAROXYSMAL POSITIONAL VERTIGO, UNSPECIFIED LATERALITY: ICD-10-CM

## 2018-05-25 DIAGNOSIS — I10 ESSENTIAL HYPERTENSION, BENIGN: ICD-10-CM

## 2018-05-25 PROCEDURE — 4040F PNEUMOC VAC/ADMIN/RCVD: CPT | Performed by: INTERNAL MEDICINE

## 2018-05-25 PROCEDURE — G8417 CALC BMI ABV UP PARAM F/U: HCPCS | Performed by: INTERNAL MEDICINE

## 2018-05-25 PROCEDURE — 99213 OFFICE O/P EST LOW 20 MIN: CPT | Performed by: INTERNAL MEDICINE

## 2018-05-25 PROCEDURE — G8427 DOCREV CUR MEDS BY ELIG CLIN: HCPCS | Performed by: INTERNAL MEDICINE

## 2018-05-25 PROCEDURE — 3017F COLORECTAL CA SCREEN DOC REV: CPT | Performed by: INTERNAL MEDICINE

## 2018-05-25 PROCEDURE — G8598 ASA/ANTIPLAT THER USED: HCPCS | Performed by: INTERNAL MEDICINE

## 2018-05-25 PROCEDURE — 1036F TOBACCO NON-USER: CPT | Performed by: INTERNAL MEDICINE

## 2018-05-25 PROCEDURE — 1123F ACP DISCUSS/DSCN MKR DOCD: CPT | Performed by: INTERNAL MEDICINE

## 2018-05-25 RX ORDER — LOSARTAN POTASSIUM 25 MG/1
12.5 TABLET ORAL DAILY
Qty: 45 TABLET | Refills: 2 | Status: SHIPPED | OUTPATIENT
Start: 2018-05-25 | End: 2018-10-04 | Stop reason: SDUPTHER

## 2018-05-25 RX ORDER — FUROSEMIDE 20 MG/1
30 TABLET ORAL EVERY MORNING
Qty: 45 TABLET | Refills: 5 | Status: SHIPPED | OUTPATIENT
Start: 2018-05-25 | End: 2018-11-15 | Stop reason: SDUPTHER

## 2018-05-25 RX ORDER — TAMSULOSIN HYDROCHLORIDE 0.4 MG/1
0.4 CAPSULE ORAL DAILY
COMMUNITY
End: 2018-05-25 | Stop reason: SDUPTHER

## 2018-05-25 RX ORDER — TAMSULOSIN HYDROCHLORIDE 0.4 MG/1
0.4 CAPSULE ORAL 2 TIMES DAILY
Qty: 60 CAPSULE | Refills: 5 | Status: SHIPPED | OUTPATIENT
Start: 2018-05-25 | End: 2019-01-31 | Stop reason: SDUPTHER

## 2018-05-25 ASSESSMENT — PATIENT HEALTH QUESTIONNAIRE - PHQ9
SUM OF ALL RESPONSES TO PHQ9 QUESTIONS 1 & 2: 0
2. FEELING DOWN, DEPRESSED OR HOPELESS: 0
SUM OF ALL RESPONSES TO PHQ QUESTIONS 1-9: 0
1. LITTLE INTEREST OR PLEASURE IN DOING THINGS: 0

## 2018-06-01 PROBLEM — I50.32 CHRONIC DIASTOLIC CHF (CONGESTIVE HEART FAILURE), NYHA CLASS 2 (HCC): Status: ACTIVE | Noted: 2018-06-01

## 2018-06-01 ASSESSMENT — ENCOUNTER SYMPTOMS
GASTROINTESTINAL NEGATIVE: 1
RESPIRATORY NEGATIVE: 1
DIARRHEA: 0
VOMITING: 0
SHORTNESS OF BREATH: 0

## 2018-07-31 RX ORDER — LORAZEPAM 0.5 MG/1
0.5 TABLET ORAL EVERY 8 HOURS PRN
Qty: 90 TABLET | Refills: 2 | OUTPATIENT
Start: 2018-07-31 | End: 2018-08-30

## 2018-07-31 NOTE — TELEPHONE ENCOUNTER
NARX SCORES  Narcotic  000  Sedative  000  Stimulant  000  Explanation and Guidance  OVERDOSE RISK SCORE     000  (Range 000-999)  Explanation and Guidance    CANNOT PULL OAARS, no entries. Last script written 4/2018 for 90 with 2 refills, should've been out since mid-July and already weaned off if he was taking it around the clock, otherwise he is likely taking it only PRN as prescribed and has meds left, which he should not be dependent on it. Again, by reviewing chart >10 minutes, no baseline anxiety control SSRI onboard, rather he is taking short-acting benzo that is habit-formin and dependence -inducing. Workload: thus far 0.25 hours of administrative time involved with clinical but non-productive (global care) and non-reimbursed physician time. Based [place] RV[place] [id number] per 15 min visit of 1 = 1 RVU not reimbursed.

## 2018-08-09 RX ORDER — POTASSIUM CHLORIDE 20 MEQ/1
TABLET, EXTENDED RELEASE ORAL
Qty: 60 TABLET | Refills: 0 | Status: SHIPPED | OUTPATIENT
Start: 2018-08-09 | End: 2018-09-05 | Stop reason: SDUPTHER

## 2018-09-10 RX ORDER — POTASSIUM CHLORIDE 1500 MG/1
TABLET, EXTENDED RELEASE ORAL
Qty: 60 TABLET | Refills: 0 | Status: SHIPPED | OUTPATIENT
Start: 2018-09-10 | End: 2018-10-16 | Stop reason: SDUPTHER

## 2018-09-20 NOTE — TELEPHONE ENCOUNTER
Former Dr Lisset Miller pt   Establishing with Dr Madhav Sullivan on 10/4/18  Please advised as DOD  Thank you

## 2018-10-04 ENCOUNTER — OFFICE VISIT (OUTPATIENT)
Dept: INTERNAL MEDICINE CLINIC | Age: 73
End: 2018-10-04
Payer: MEDICARE

## 2018-10-04 ENCOUNTER — OFFICE VISIT (OUTPATIENT)
Dept: CARDIOLOGY CLINIC | Age: 73
End: 2018-10-04
Payer: MEDICARE

## 2018-10-04 VITALS
BODY MASS INDEX: 26.03 KG/M2 | DIASTOLIC BLOOD PRESSURE: 64 MMHG | WEIGHT: 184 LBS | SYSTOLIC BLOOD PRESSURE: 120 MMHG | HEART RATE: 64 BPM

## 2018-10-04 VITALS
DIASTOLIC BLOOD PRESSURE: 80 MMHG | BODY MASS INDEX: 25.76 KG/M2 | HEART RATE: 72 BPM | WEIGHT: 184 LBS | SYSTOLIC BLOOD PRESSURE: 136 MMHG | TEMPERATURE: 98.1 F | RESPIRATION RATE: 16 BRPM | HEIGHT: 71 IN

## 2018-10-04 DIAGNOSIS — E11.42 TYPE 2 DIABETES MELLITUS WITH PERIPHERAL NEUROPATHY (HCC): Primary | ICD-10-CM

## 2018-10-04 DIAGNOSIS — I50.32 CHRONIC DIASTOLIC CHF (CONGESTIVE HEART FAILURE), NYHA CLASS 2 (HCC): ICD-10-CM

## 2018-10-04 DIAGNOSIS — E11.42 TYPE 2 DIABETES MELLITUS WITH PERIPHERAL NEUROPATHY (HCC): ICD-10-CM

## 2018-10-04 DIAGNOSIS — F51.02 ADJUSTMENT INSOMNIA: ICD-10-CM

## 2018-10-04 DIAGNOSIS — I10 ESSENTIAL HYPERTENSION, BENIGN: ICD-10-CM

## 2018-10-04 DIAGNOSIS — E78.2 MIXED HYPERLIPIDEMIA: Primary | ICD-10-CM

## 2018-10-04 DIAGNOSIS — I25.10 CORONARY ARTERY DISEASE INVOLVING NATIVE CORONARY ARTERY OF NATIVE HEART WITHOUT ANGINA PECTORIS: ICD-10-CM

## 2018-10-04 DIAGNOSIS — E78.2 MIXED HYPERLIPIDEMIA: ICD-10-CM

## 2018-10-04 LAB
A/G RATIO: 1.9 (ref 1.1–2.2)
ALBUMIN SERPL-MCNC: 4.4 G/DL (ref 3.4–5)
ALP BLD-CCNC: 59 U/L (ref 40–129)
ALT SERPL-CCNC: 19 U/L (ref 10–40)
ANION GAP SERPL CALCULATED.3IONS-SCNC: 13 MMOL/L (ref 3–16)
AST SERPL-CCNC: 23 U/L (ref 15–37)
BILIRUB SERPL-MCNC: 0.7 MG/DL (ref 0–1)
BUN BLDV-MCNC: 15 MG/DL (ref 7–20)
CALCIUM SERPL-MCNC: 9.7 MG/DL (ref 8.3–10.6)
CHLORIDE BLD-SCNC: 103 MMOL/L (ref 99–110)
CO2: 26 MMOL/L (ref 21–32)
CREAT SERPL-MCNC: 0.9 MG/DL (ref 0.8–1.3)
CREATININE URINE: 211.9 MG/DL (ref 39–259)
GFR AFRICAN AMERICAN: >60
GFR NON-AFRICAN AMERICAN: >60
GLOBULIN: 2.3 G/DL
GLUCOSE BLD-MCNC: 135 MG/DL (ref 70–99)
MICROALBUMIN UR-MCNC: 3 MG/DL
MICROALBUMIN/CREAT UR-RTO: 14.2 MG/G (ref 0–30)
POTASSIUM SERPL-SCNC: 4.5 MMOL/L (ref 3.5–5.1)
SODIUM BLD-SCNC: 142 MMOL/L (ref 136–145)
TOTAL PROTEIN: 6.7 G/DL (ref 6.4–8.2)

## 2018-10-04 PROCEDURE — G8427 DOCREV CUR MEDS BY ELIG CLIN: HCPCS | Performed by: INTERNAL MEDICINE

## 2018-10-04 PROCEDURE — 1101F PT FALLS ASSESS-DOCD LE1/YR: CPT | Performed by: INTERNAL MEDICINE

## 2018-10-04 PROCEDURE — 3017F COLORECTAL CA SCREEN DOC REV: CPT | Performed by: INTERNAL MEDICINE

## 2018-10-04 PROCEDURE — G8598 ASA/ANTIPLAT THER USED: HCPCS | Performed by: INTERNAL MEDICINE

## 2018-10-04 PROCEDURE — G0008 ADMIN INFLUENZA VIRUS VAC: HCPCS | Performed by: INTERNAL MEDICINE

## 2018-10-04 PROCEDURE — 90662 IIV NO PRSV INCREASED AG IM: CPT | Performed by: INTERNAL MEDICINE

## 2018-10-04 PROCEDURE — 4040F PNEUMOC VAC/ADMIN/RCVD: CPT | Performed by: INTERNAL MEDICINE

## 2018-10-04 PROCEDURE — 99214 OFFICE O/P EST MOD 30 MIN: CPT | Performed by: INTERNAL MEDICINE

## 2018-10-04 PROCEDURE — 1123F ACP DISCUSS/DSCN MKR DOCD: CPT | Performed by: INTERNAL MEDICINE

## 2018-10-04 PROCEDURE — G8482 FLU IMMUNIZE ORDER/ADMIN: HCPCS | Performed by: INTERNAL MEDICINE

## 2018-10-04 PROCEDURE — 3046F HEMOGLOBIN A1C LEVEL >9.0%: CPT | Performed by: INTERNAL MEDICINE

## 2018-10-04 PROCEDURE — 1036F TOBACCO NON-USER: CPT | Performed by: INTERNAL MEDICINE

## 2018-10-04 PROCEDURE — 99213 OFFICE O/P EST LOW 20 MIN: CPT | Performed by: INTERNAL MEDICINE

## 2018-10-04 PROCEDURE — G8417 CALC BMI ABV UP PARAM F/U: HCPCS | Performed by: INTERNAL MEDICINE

## 2018-10-04 PROCEDURE — 2022F DILAT RTA XM EVC RTNOPTHY: CPT | Performed by: INTERNAL MEDICINE

## 2018-10-04 RX ORDER — AMITRIPTYLINE HYDROCHLORIDE 25 MG/1
25 TABLET, FILM COATED ORAL NIGHTLY
Qty: 30 TABLET | Refills: 3 | Status: SHIPPED | OUTPATIENT
Start: 2018-10-04 | End: 2019-04-04

## 2018-10-04 RX ORDER — LOSARTAN POTASSIUM 100 MG/1
100 TABLET ORAL DAILY
Qty: 30 TABLET | Refills: 2 | Status: SHIPPED | OUTPATIENT
Start: 2018-10-04 | End: 2018-12-17 | Stop reason: SDUPTHER

## 2018-10-04 RX ORDER — KETOCONAZOLE 20 MG/ML
SHAMPOO TOPICAL
Qty: 60 ML | Refills: 1 | Status: SHIPPED | OUTPATIENT
Start: 2018-10-04 | End: 2019-10-01 | Stop reason: SDUPTHER

## 2018-10-04 NOTE — PROGRESS NOTES
Subjective:      Patient ID: Pillo Mckay is a 68 y.o. male. CC:  Follow up CAD    HPI Levon Sweet is s/p CABG and is intending to play golf this year. No cp nor syncope. No NTG use. Allergies   Allergen Reactions    Lisinopril Other (See Comments)     cough        Social History     Social History    Marital status:      Spouse name: Shannon Leblanc Number of children: 0    Years of education: 25     Occupational History    - Retired       Social History Main Topics    Smoking status: Former Smoker     Types: Cigarettes     Quit date: 1/1/1983    Smokeless tobacco: Never Used      Comment: ~40 pack years    Alcohol use No    Drug use: No    Sexual activity: Yes     Partners: Female     Other Topics Concern    Not on file     Social History Narrative    No narrative on file        Patient has a family history includes Colon Cancer in his father; Coronary Art Dis in his brother and brother; Other in his mother. Patient  has a past medical history of CAD (coronary artery disease); Colitis; Diabetes mellitus, type 2 ([address] Diabetic peripheral neuropathy ([address] Emphysema; Esophageal reflux; Essential hypertension, benign; Hyperlipidemia; Impaired fasting glucose; Impotence of organic origin; Insomnia, unspecified; Irritable bowel syndrome; Nephrolithiasis; Psoriasis; Pulmonary nodules; and Thrombocytopenia, unspecified ([address] Current Outpatient Prescriptions   Medication Sig Dispense Refill    ketoconazole (NIZORAL) 2 % shampoo Apply topically daily as needed.  60 mL 1    losartan (COZAAR) 100 MG tablet Take 1 tablet by mouth daily 30 tablet 2    amitriptyline (ELAVIL) 25 MG tablet Take 1 tablet by mouth nightly 30 tablet 3    SITagliptin (JANUVIA) 100 MG tablet TAKE 1 TABLET BY MOUTH EVERY DAY 30 tablet 0    KLOR-CON M20 20 MEQ extended release tablet TAKE 2 TABLET BY MOUTH EVERY DAY (Patient taking differently: TAKE 1 TABLET BY MOUTH TWICE DAILY) 60 tablet 0    tamsulosin (FLOMAX) 0.4 MG capsule Take 1 capsule by mouth 2 times daily 60 capsule 5    furosemide (LASIX) 20 MG tablet Take 1.5 tablets by mouth every morning 45 tablet 5    albuterol sulfate  (90 Base) MCG/ACT inhaler Inhale 2 puffs into the lungs 4 times daily      doxycycline hyclate (VIBRAMYCIN) 100 MG capsule Take 100 mg by mouth 2 times daily      zoster recombinant adjuvanted vaccine (SHINGRIX) 50 MCG SUSR injection Inject 0.5 ml intramuscularly now and repeat dose in 2 months 1 each 1    atorvastatin (LIPITOR) 40 MG tablet Take 1 tablet by mouth daily (Patient taking differently: Take 20 mg by mouth daily ) 90 tablet 3    metoprolol tartrate (LOPRESSOR) 25 MG tablet TAKE 1/2 A TAB BY MOUTH TWICE A DAY 60 tablet 3    metFORMIN (GLUCOPHAGE-XR) 500 MG extended release tablet TAKE 1 TABLET BY MOUTH TWICE A DAY WITH FOOD 180 tablet 2    sildenafil (VIAGRA) 100 MG tablet Take 1 tablet by mouth as needed for Erectile Dysfunction 10 tablet 5    Cholecalciferol (VITAMIN D) 2000 UNITS CAPS capsule Take by mouth      Fexofenadine HCl (ALLEGRA ALLERGY PO) Take 1 tablet by mouth daily      Omeprazole-Sodium Bicarbonate (ZEGERID OTC PO) 1 capsule by mouth at bedtime.  Pyridoxine HCl (VITAMIN B-6) 50 MG tablet Take 50 mg by mouth daily      aspirin 81 MG tablet Take 81 mg by mouth daily      fluticasone (FLONASE) 50 MCG/ACT nasal spray INSTILL 2 SPRAYS INTO EACH NOSTRIL DAILY FOR SINUS CONGESTION 3 Bottle 3    hyoscyamine (LEVSIN/SL) 0.125 MG SL tablet Take 1 tablet by mouth every 4 hours as needed (Abdominal bloating). 100 tablet 6    halcinonide (HALOG) 0.1 % CREA Apply to rash twice a day as needed. 60 g 1    therapeutic multivitamin-minerals (THERAGRAN-M) tablet Take 1 tablet by mouth daily. No current facility-administered medications for this visit. Vitals  Weight: 184 lb (83.5 kg)  Blood Pressure: BP: (120-136)/(64-80)    Pulse: 64            Diagnosis Orders   1.  Mixed hyperlipidemia     2. Chronic diastolic CHF (congestive heart failure), NYHA class 2 ([address]     3. Coronary artery disease involving native coronary artery of native heart without angina pectoris     4. Hypertension, benign            Review of Systems   Constitutional: Negative. HENT: Negative. Respiratory: Negative. Cardiovascular: Negative. Gastrointestinal: Negative. Genitourinary: Negative. Musculoskeletal: Negative. Skin: Negative. Allergic/Immunologic: Negative. Neurological: Negative. Hematological: Negative. Psychiatric/Behavioral: Negative. Objective:   Physical Exam   Constitutional: He is oriented to person, place, and time. He appears well-developed and well-nourished. HENT:   Head: Normocephalic and atraumatic. Eyes: Conjunctivae and EOM are normal. Pupils are equal, round, and reactive to light. Neck: Normal range of motion. Neck supple. Cardiovascular: Normal rate and regular rhythm. Exam reveals no friction rub. II/VI HS murmur heard. Pulmonary/Chest: Effort normal and breath sounds normal.   Abdominal: Soft. Bowel sounds are normal.   Musculoskeletal: Normal range of motion. He exhibits no edema or tenderness. Neurological: He is alert and oriented to person, place, and time. No cranial nerve deficit. Coordination normal.   Skin: Skin is warm. No rash noted. No erythema. No pallor. Psychiatric: He has a normal mood and affect. His behavior is normal. Judgment and thought content normal.       Assessment:      Atypical chest pain. Plan:      Continue current medications. Stable cardiovascular status after 5 v CABG on 2/1/16. Doing well. sr with old RBBB. SOB:  Better with bp control  Palpitations:  Continue lopressor.

## 2018-10-04 NOTE — PROGRESS NOTES
PROGRESS NOTE:    Cory Belcher    10/5/2018    Chief Complaint   Patient presents with   Alisha Marquis Patient     Former Dr. Merle Albright patient. HPI:    Mr(s)Woodrow Belcher presents to clinic today with issues noted above. Patient is taking BP medications at home without side effects, BP is being checked at home, states 180's up to at times @ home. Patient is tolerating anti-lipid meds such as statin without complications, no myalgia. Patient is taking DM meds as prescribed and is  checking sugars at home, denies hypoglycemia. He was asking about his Ativan, it was denied for prn use for insomnia 30 days prior and he has been out of meds. Patient denies chest pain, SOB, NVD, FC, rash, malaise, rigor, dizziness/lightheadness, other pertinent ROS was also reviewed. /80 (Site: Right Upper Arm, Position: Sitting, Cuff Size: Medium Adult)   Pulse 72 Comment: Regular  Temp 98.1 °F (36.7 °C) (Oral)   Resp 16   Ht 5' 10.5\" (1.791 m)   Wt 184 lb (83.5 kg)   BMI 26.03 kg/m²   Body mass index is 26.03 kg/m². Allergies   Allergen Reactions    Lisinopril Other (See Comments)     cough     Physical Exam:    Gen: Patient appears well groomed, well appearing  HEAD: Atraumatic, normocephalic,   Eyes: PERRLA, EOMI   Neck: supple, no thyroid nodule appreciated, no JVD  Chest: Clear to auscultation ROMANA, unlabored breathing, normal expansion  Heart: Regular rate, regular rhythm, no murmur, no rub  Abdomen: Non-tender, non-distended, bowel sounds present x3  Extremities: no edema, distal pulses intact  Patient was alert and oriented to person, place and time  DM foot exam: neg for any open lesions, dry, sensory test with good pinpoint distinction from plantar surface of toes to dorsum ROMANA up to mid shins ROMANA     Assessment and Plan:    Aurelia Codey was seen today for new patient. Diagnoses and all orders for this visit:    Type 2 diabetes mellitus with peripheral neuropathy ([address]  Controlled?   Last A1c was ~1 year

## 2018-10-05 LAB
ESTIMATED AVERAGE GLUCOSE: 134.1 MG/DL
HBA1C MFR BLD: 6.3 %

## 2018-10-16 RX ORDER — SITAGLIPTIN 100 MG/1
TABLET, FILM COATED ORAL
Qty: 30 TABLET | Refills: 0 | Status: SHIPPED | OUTPATIENT
Start: 2018-10-16 | End: 2018-11-17 | Stop reason: SDUPTHER

## 2018-10-16 RX ORDER — POTASSIUM CHLORIDE 1500 MG/1
TABLET, EXTENDED RELEASE ORAL
Qty: 60 TABLET | Refills: 0 | Status: SHIPPED | OUTPATIENT
Start: 2018-10-16 | End: 2018-11-29 | Stop reason: SDUPTHER

## 2018-10-16 RX ORDER — METFORMIN HYDROCHLORIDE 500 MG/1
TABLET, EXTENDED RELEASE ORAL
Qty: 180 TABLET | Refills: 2 | Status: SHIPPED | OUTPATIENT
Start: 2018-10-16 | End: 2019-04-04 | Stop reason: SDUPTHER

## 2018-11-16 RX ORDER — FUROSEMIDE 20 MG/1
30 TABLET ORAL EVERY MORNING
Qty: 45 TABLET | Refills: 5 | Status: SHIPPED | OUTPATIENT
Start: 2018-11-16 | End: 2019-04-04 | Stop reason: SDUPTHER

## 2018-11-29 RX ORDER — POTASSIUM CHLORIDE 20 MEQ/1
TABLET, EXTENDED RELEASE ORAL
Qty: 60 TABLET | Refills: 2 | Status: SHIPPED | OUTPATIENT
Start: 2018-11-29 | End: 2019-02-28 | Stop reason: SDUPTHER

## 2019-02-02 RX ORDER — TAMSULOSIN HYDROCHLORIDE 0.4 MG/1
0.4 CAPSULE ORAL 2 TIMES DAILY
Qty: 60 CAPSULE | Refills: 2 | Status: SHIPPED | OUTPATIENT
Start: 2019-02-02 | End: 2019-04-04 | Stop reason: SDUPTHER

## 2019-02-28 RX ORDER — POTASSIUM CHLORIDE 20 MEQ/1
TABLET, EXTENDED RELEASE ORAL
Qty: 60 TABLET | Refills: 0 | Status: SHIPPED | OUTPATIENT
Start: 2019-02-28 | End: 2019-04-04 | Stop reason: SDUPTHER

## 2019-03-04 ENCOUNTER — OFFICE VISIT (OUTPATIENT)
Dept: SLEEP MEDICINE | Age: 74
End: 2019-03-04
Payer: MEDICARE

## 2019-03-04 VITALS
WEIGHT: 188.8 LBS | RESPIRATION RATE: 16 BRPM | TEMPERATURE: 97 F | OXYGEN SATURATION: 96 % | SYSTOLIC BLOOD PRESSURE: 155 MMHG | DIASTOLIC BLOOD PRESSURE: 96 MMHG | BODY MASS INDEX: 26.43 KG/M2 | HEART RATE: 55 BPM | HEIGHT: 71 IN

## 2019-03-04 DIAGNOSIS — F51.5 NIGHTMARES REM-SLEEP TYPE: Primary | ICD-10-CM

## 2019-03-04 DIAGNOSIS — G47.19 EXCESSIVE DAYTIME SLEEPINESS: ICD-10-CM

## 2019-03-04 DIAGNOSIS — G47.33 OSA (OBSTRUCTIVE SLEEP APNEA): ICD-10-CM

## 2019-03-04 PROCEDURE — G8598 ASA/ANTIPLAT THER USED: HCPCS | Performed by: PSYCHIATRY & NEUROLOGY

## 2019-03-04 PROCEDURE — 1123F ACP DISCUSS/DSCN MKR DOCD: CPT | Performed by: PSYCHIATRY & NEUROLOGY

## 2019-03-04 PROCEDURE — 1036F TOBACCO NON-USER: CPT | Performed by: PSYCHIATRY & NEUROLOGY

## 2019-03-04 PROCEDURE — G8417 CALC BMI ABV UP PARAM F/U: HCPCS | Performed by: PSYCHIATRY & NEUROLOGY

## 2019-03-04 PROCEDURE — 4040F PNEUMOC VAC/ADMIN/RCVD: CPT | Performed by: PSYCHIATRY & NEUROLOGY

## 2019-03-04 PROCEDURE — G8427 DOCREV CUR MEDS BY ELIG CLIN: HCPCS | Performed by: PSYCHIATRY & NEUROLOGY

## 2019-03-04 PROCEDURE — 3017F COLORECTAL CA SCREEN DOC REV: CPT | Performed by: PSYCHIATRY & NEUROLOGY

## 2019-03-04 PROCEDURE — G8482 FLU IMMUNIZE ORDER/ADMIN: HCPCS | Performed by: PSYCHIATRY & NEUROLOGY

## 2019-03-04 PROCEDURE — 1101F PT FALLS ASSESS-DOCD LE1/YR: CPT | Performed by: PSYCHIATRY & NEUROLOGY

## 2019-03-04 PROCEDURE — 99204 OFFICE O/P NEW MOD 45 MIN: CPT | Performed by: PSYCHIATRY & NEUROLOGY

## 2019-03-04 ASSESSMENT — SLEEP AND FATIGUE QUESTIONNAIRES
HOW LIKELY ARE YOU TO NOD OFF OR FALL ASLEEP WHEN YOU ARE A PASSENGER IN A CAR FOR AN HOUR WITHOUT A BREAK: 1
HOW LIKELY ARE YOU TO NOD OFF OR FALL ASLEEP IN A CAR, WHILE STOPPED FOR A FEW MINUTES IN TRAFFIC: 0
HOW LIKELY ARE YOU TO NOD OFF OR FALL ASLEEP WHILE LYING DOWN TO REST IN THE AFTERNOON WHEN CIRCUMSTANCES PERMIT: 1
HOW LIKELY ARE YOU TO NOD OFF OR FALL ASLEEP WHILE SITTING QUIETLY AFTER LUNCH WITHOUT ALCOHOL: 0
HOW LIKELY ARE YOU TO NOD OFF OR FALL ASLEEP WHILE SITTING AND READING: 3
NECK CIRCUMFERENCE (INCHES): 16
HOW LIKELY ARE YOU TO NOD OFF OR FALL ASLEEP WHILE WATCHING TV: 3
HOW LIKELY ARE YOU TO NOD OFF OR FALL ASLEEP WHILE SITTING AND TALKING TO SOMEONE: 0
HOW LIKELY ARE YOU TO NOD OFF OR FALL ASLEEP WHILE SITTING INACTIVE IN A PUBLIC PLACE: 0
ESS TOTAL SCORE: 8

## 2019-03-04 ASSESSMENT — ENCOUNTER SYMPTOMS
RESPIRATORY NEGATIVE: 1
ALLERGIC/IMMUNOLOGIC NEGATIVE: 1
GASTROINTESTINAL NEGATIVE: 1
APNEA: 0
EYES NEGATIVE: 1
CHOKING: 0

## 2019-03-31 ENCOUNTER — HOSPITAL ENCOUNTER (OUTPATIENT)
Dept: SLEEP CENTER | Age: 74
Discharge: HOME | End: 2019-03-31
Payer: MEDICARE

## 2019-03-31 DIAGNOSIS — F51.5 NIGHTMARES REM-SLEEP TYPE: ICD-10-CM

## 2019-03-31 DIAGNOSIS — G47.19 EXCESSIVE DAYTIME SLEEPINESS: ICD-10-CM

## 2019-03-31 PROCEDURE — 95810 POLYSOM 6/> YRS 4/> PARAM: CPT

## 2019-03-31 PROCEDURE — 95810 POLYSOM 6/> YRS 4/> PARAM: CPT | Performed by: PSYCHIATRY & NEUROLOGY

## 2019-04-04 ENCOUNTER — OFFICE VISIT (OUTPATIENT)
Dept: INTERNAL MEDICINE CLINIC | Age: 74
End: 2019-04-04
Payer: MEDICARE

## 2019-04-04 ENCOUNTER — OFFICE VISIT (OUTPATIENT)
Dept: CARDIOLOGY CLINIC | Age: 74
End: 2019-04-04
Payer: MEDICARE

## 2019-04-04 VITALS
RESPIRATION RATE: 16 BRPM | SYSTOLIC BLOOD PRESSURE: 122 MMHG | BODY MASS INDEX: 27.16 KG/M2 | DIASTOLIC BLOOD PRESSURE: 78 MMHG | HEART RATE: 60 BPM | TEMPERATURE: 98.1 F | WEIGHT: 192 LBS | OXYGEN SATURATION: 96 %

## 2019-04-04 VITALS
BODY MASS INDEX: 26.99 KG/M2 | WEIGHT: 190.8 LBS | DIASTOLIC BLOOD PRESSURE: 70 MMHG | SYSTOLIC BLOOD PRESSURE: 138 MMHG | HEART RATE: 60 BPM

## 2019-04-04 DIAGNOSIS — Z95.1 S/P CABG X 5: ICD-10-CM

## 2019-04-04 DIAGNOSIS — E78.2 MIXED HYPERLIPIDEMIA: ICD-10-CM

## 2019-04-04 DIAGNOSIS — Z23 NEED FOR PROPHYLACTIC VACCINATION AND INOCULATION AGAINST VARICELLA: ICD-10-CM

## 2019-04-04 DIAGNOSIS — E11.42 TYPE 2 DIABETES MELLITUS WITH PERIPHERAL NEUROPATHY (HCC): Primary | ICD-10-CM

## 2019-04-04 DIAGNOSIS — F51.02 ADJUSTMENT INSOMNIA: ICD-10-CM

## 2019-04-04 DIAGNOSIS — I50.32 CHRONIC DIASTOLIC CHF (CONGESTIVE HEART FAILURE), NYHA CLASS 2 (HCC): ICD-10-CM

## 2019-04-04 DIAGNOSIS — Z79.899 HIGH RISK MEDICATION USE: ICD-10-CM

## 2019-04-04 DIAGNOSIS — Z13.21 ENCOUNTER FOR VITAMIN DEFICIENCY SCREENING: ICD-10-CM

## 2019-04-04 DIAGNOSIS — I10 ESSENTIAL HYPERTENSION, BENIGN: ICD-10-CM

## 2019-04-04 DIAGNOSIS — D69.6 THROMBOCYTOPENIA (HCC): ICD-10-CM

## 2019-04-04 DIAGNOSIS — I50.32 CHRONIC DIASTOLIC CHF (CONGESTIVE HEART FAILURE), NYHA CLASS 2 (HCC): Primary | ICD-10-CM

## 2019-04-04 DIAGNOSIS — I10 ESSENTIAL HYPERTENSION: ICD-10-CM

## 2019-04-04 PROCEDURE — G8428 CUR MEDS NOT DOCUMENT: HCPCS | Performed by: INTERNAL MEDICINE

## 2019-04-04 PROCEDURE — 99214 OFFICE O/P EST MOD 30 MIN: CPT | Performed by: INTERNAL MEDICINE

## 2019-04-04 PROCEDURE — 4040F PNEUMOC VAC/ADMIN/RCVD: CPT | Performed by: INTERNAL MEDICINE

## 2019-04-04 PROCEDURE — 3044F HG A1C LEVEL LT 7.0%: CPT | Performed by: INTERNAL MEDICINE

## 2019-04-04 PROCEDURE — G8598 ASA/ANTIPLAT THER USED: HCPCS | Performed by: INTERNAL MEDICINE

## 2019-04-04 PROCEDURE — 2022F DILAT RTA XM EVC RTNOPTHY: CPT | Performed by: INTERNAL MEDICINE

## 2019-04-04 PROCEDURE — 1123F ACP DISCUSS/DSCN MKR DOCD: CPT | Performed by: INTERNAL MEDICINE

## 2019-04-04 PROCEDURE — 3017F COLORECTAL CA SCREEN DOC REV: CPT | Performed by: INTERNAL MEDICINE

## 2019-04-04 PROCEDURE — G8427 DOCREV CUR MEDS BY ELIG CLIN: HCPCS | Performed by: INTERNAL MEDICINE

## 2019-04-04 PROCEDURE — 1036F TOBACCO NON-USER: CPT | Performed by: INTERNAL MEDICINE

## 2019-04-04 PROCEDURE — G8417 CALC BMI ABV UP PARAM F/U: HCPCS | Performed by: INTERNAL MEDICINE

## 2019-04-04 PROCEDURE — 93000 ELECTROCARDIOGRAM COMPLETE: CPT | Performed by: INTERNAL MEDICINE

## 2019-04-04 RX ORDER — TRAZODONE HYDROCHLORIDE 50 MG/1
50 TABLET ORAL NIGHTLY
Qty: 30 TABLET | Refills: 0 | Status: SHIPPED | OUTPATIENT
Start: 2019-04-04 | End: 2019-05-13 | Stop reason: SDUPTHER

## 2019-04-04 RX ORDER — ATORVASTATIN CALCIUM 40 MG/1
20 TABLET, FILM COATED ORAL DAILY
Qty: 30 TABLET | Refills: 5 | Status: SHIPPED | OUTPATIENT
Start: 2019-04-04 | End: 2019-06-25 | Stop reason: SDUPTHER

## 2019-04-04 RX ORDER — TAMSULOSIN HYDROCHLORIDE 0.4 MG/1
0.4 CAPSULE ORAL 2 TIMES DAILY
Qty: 60 CAPSULE | Refills: 5 | Status: SHIPPED | OUTPATIENT
Start: 2019-04-04 | End: 2019-10-01

## 2019-04-04 RX ORDER — LOSARTAN POTASSIUM 100 MG/1
TABLET ORAL
Qty: 30 TABLET | Refills: 5 | Status: SHIPPED | OUTPATIENT
Start: 2019-04-04 | End: 2019-10-01

## 2019-04-04 RX ORDER — FUROSEMIDE 20 MG/1
20 TABLET ORAL
Qty: 15 TABLET | Refills: 5 | Status: SHIPPED | OUTPATIENT
Start: 2019-04-04 | End: 2019-04-12 | Stop reason: SDUPTHER

## 2019-04-04 RX ORDER — POTASSIUM CHLORIDE 20 MEQ/1
TABLET, EXTENDED RELEASE ORAL
Qty: 60 TABLET | Refills: 2 | Status: SHIPPED | OUTPATIENT
Start: 2019-04-04 | End: 2019-06-27 | Stop reason: SDUPTHER

## 2019-04-04 RX ORDER — METFORMIN HYDROCHLORIDE 500 MG/1
TABLET, EXTENDED RELEASE ORAL
Qty: 180 TABLET | Refills: 5 | Status: SHIPPED | OUTPATIENT
Start: 2019-04-04 | End: 2020-05-11 | Stop reason: SDUPTHER

## 2019-04-04 ASSESSMENT — PATIENT HEALTH QUESTIONNAIRE - PHQ9
1. LITTLE INTEREST OR PLEASURE IN DOING THINGS: 0
SUM OF ALL RESPONSES TO PHQ9 QUESTIONS 1 & 2: 0
SUM OF ALL RESPONSES TO PHQ QUESTIONS 1-9: 0
2. FEELING DOWN, DEPRESSED OR HOPELESS: 0
SUM OF ALL RESPONSES TO PHQ QUESTIONS 1-9: 0

## 2019-04-04 NOTE — PROGRESS NOTES
Subjective:      Patient ID: Parul Prieto is a 68 y.o. male. CC:  Follow up CAD    HPI Min Groves is s/p CABG and is intending to play golf this year. No cp nor syncope. No NTG use. Allergies   Allergen Reactions    Lisinopril Other (See Comments)     cough        Social History     Socioeconomic History    Marital status:      Spouse name: Flo Olvera Number of children: 0    Years of education: 25    Highest education level: Not on file   Occupational History    Occupation: - Retired    Social Needs    Financial resource strain: Not on file    Food insecurity:     Worry: Not on file     Inability: Not on file   [employer] needs:     Medical: Not on file     Non-medical: Not on file   Tobacco Use    Smoking status: Former Smoker     Types: Cigarettes     Last attempt to quit: 1983     Years since quittin.2    Smokeless tobacco: Never Used    Tobacco comment: ~40 pack years   Substance and Sexual Activity    Alcohol use: No    Drug use: No    Sexual activity: Yes     Partners: Female   Lifestyle    Physical activity:     Days per week: Not on file     Minutes per session: Not on file    Stress: Not on file   Relationships    Social connections:     Talks on phone: Not on file     Gets together: Not on file     Attends [employer] service: Not on file     Active member of club or organization: Not on file     Attends meetings of clubs or organizations: Not on file     Relationship status: Not on file    Intimate partner violence:     Fear of current or ex partner: Not on file     Emotionally abused: Not on file     Physically abused: Not on file     Forced sexual activity: Not on file   Other Topics Concern    Not on file   Social History Narrative    Not on file        Patient has a family history includes Colon Cancer in his father; Coronary Art Dis in his brother and brother; Other in his mother.     Patient  has a past medical history of CAD (coronary artery disease), Colitis, Diabetes mellitus, type 2 ([facility] [address] Diabetic peripheral neuropathy ([facility] [address] Emphysema, Esophageal reflux, Essential hypertension, benign, Hyperlipidemia, Impaired fasting glucose, Impotence of organic origin, Insomnia, unspecified, Irritable bowel syndrome, Nephrolithiasis, Psoriasis, Pulmonary nodules, and Thrombocytopenia, unspecified ([facility] [address] Current Outpatient Medications   Medication Sig Dispense Refill    potassium chloride (KLOR-CON M) 20 MEQ extended release tablet TAKE 2 TABLETS BY MOUTH EVERY DAY 60 tablet 2    tamsulosin (FLOMAX) 0.4 MG capsule Take 1 capsule by mouth 2 times daily 60 capsule 5    atorvastatin (LIPITOR) 40 MG tablet Take 0.5 tablets by mouth daily 30 tablet 5    losartan (COZAAR) 100 MG tablet TAKE 1 TABLET BY MOUTH EVERY DAY 30 tablet 5    metoprolol tartrate (LOPRESSOR) 25 MG tablet TAKE 1/2 A TAB BY MOUTH TWICE A DAY 60 tablet 5    SITagliptin (JANUVIA) 100 MG tablet TAKE 1 TABLET BY MOUTH EVERY DAY 30 tablet 5    metFORMIN (GLUCOPHAGE-XR) 500 MG extended release tablet TAKE 1 TABLET BY MOUTH TWICE A DAY WITH FOOD 180 tablet 5    furosemide (LASIX) 20 MG tablet Take 1 tablet by mouth every 48 hours (Patient taking differently: Take 20 mg by mouth four times a week ) 15 tablet 5    zoster recombinant adjuvanted vaccine (SHINGRIX) 50 MCG/0.5ML SUSR injection 50 MCG IM then repeat 2-6 months. 0.5 mL 1    traZODone (DESYREL) 50 MG tablet Take 1 tablet by mouth nightly 30 tablet 0    ketoconazole (NIZORAL) 2 % shampoo Apply topically daily as needed. 60 mL 1    sildenafil (VIAGRA) 100 MG tablet Take 1 tablet by mouth as needed for Erectile Dysfunction 10 tablet 5    Cholecalciferol (VITAMIN D) 2000 UNITS CAPS capsule Take by mouth      Omeprazole-Sodium Bicarbonate (ZEGERID OTC PO) 1 capsule by mouth at bedtime.       Pyridoxine HCl (VITAMIN B-6) 50 MG tablet Take 50 mg by mouth daily      aspirin 81 MG tablet Take 81 mg by mouth daily      fluticasone (FLONASE) 50 MCG/ACT nasal spray INSTILL 2 SPRAYS INTO EACH NOSTRIL DAILY FOR SINUS CONGESTION 3 Bottle 3    halcinonide (HALOG) 0.1 % CREA Apply to rash twice a day as needed. 60 g 1    therapeutic multivitamin-minerals (THERAGRAN-M) tablet Take 1 tablet by mouth daily.  albuterol sulfate  (90 Base) MCG/ACT inhaler Inhale 2 puffs into the lungs 4 times daily      hyoscyamine (LEVSIN/SL) 0.125 MG SL tablet Take 1 tablet by mouth every 4 hours as needed (Abdominal bloating). 100 tablet 6     No current facility-administered medications for this visit. Vitals  Weight: 190 lb 12.8 oz (86.5 kg)  Blood Pressure: BP: (122-138)/(70-78)    Pulse: 60            Diagnosis Orders   1. Chronic diastolic CHF (congestive heart failure), NYHA class 2 ([facility])  EKG 12 lead    Lipid Panel   2. S/P CABG x 5  Lipid Panel   3. Essential hypertension  Lipid Panel   4. Mixed hyperlipidemia  Lipid Panel          Review of Systems   Constitutional: Negative. HENT: Negative. Respiratory: Negative. Cardiovascular: Negative. Gastrointestinal: Negative. Genitourinary: Negative. Musculoskeletal: Negative. Skin: Negative. Allergic/Immunologic: Negative. Neurological: Negative. Hematological: Negative. Psychiatric/Behavioral: Negative. Objective:   Physical Exam   Constitutional: He is oriented to person, place, and time. He appears well-developed and well-nourished. HENT:   Head: Normocephalic and atraumatic. Eyes: Conjunctivae and EOM are normal. Pupils are equal, round, and reactive to light. Neck: Normal range of motion. Neck supple. Cardiovascular: Normal rate and regular rhythm. Exam reveals no friction rub. II/VI HS murmur heard. Pulmonary/Chest: Effort normal and breath sounds normal.   Abdominal: Soft. Bowel sounds are normal.   Musculoskeletal: Normal range of motion. He exhibits no edema or tenderness.    Neurological: He is alert and oriented to person, place, and

## 2019-04-04 NOTE — PATIENT INSTRUCTIONS
Patient Education        Home Blood Pressure Test: About This Test  What is it? A home blood pressure test allows you to keep track of your blood pressure at home. Blood pressure is a measure of the force of blood against the walls of your arteries. Blood pressure readings include two numbers, such as 130/80 (say \"130 over 80\"). The first number is the systolic pressure. The second number is the diastolic pressure. Why is this test done? You may do this test at home to:  · Find out if you have high blood pressure. · Track your blood pressure if you have high blood pressure. · Track how well medicine is working to reduce high blood pressure. · Check how lifestyle changes, such as weight loss and exercise, are affecting blood pressure. How can you prepare for the test?  · Do not use caffeine, tobacco, or medicines known to raise blood pressure (such as nasal decongestant sprays) for at least 30 minutes before taking your blood pressure. · Do not exercise for at least 30 minutes before taking your blood pressure. What happens before the test?  Take your blood pressure while you feel comfortable and relaxed. Sit quietly with both feet on the floor for at least 5 minutes before the test.  What happens during the test?  · Sit with your arm slightly bent and resting on a table so that your upper arm is at the same level as your heart. · Roll up your sleeve or take off your shirt to expose your upper arm. · Wrap the blood pressure cuff around your upper arm so that the lower edge of the cuff is about 1 inch above the bend of your elbow. Proceed with the following steps depending on if you are using an automatic or manual pressure monitor. Automatic blood pressure monitors  · Press the on/off button on the automatic monitor and wait until the ready-to-measure \"heart\" symbol appears next to zero in the display window. · Press the start button. The cuff will inflate and deflate by itself.   · Your blood pressure numbers will appear on the screen. · Write your numbers in your log book, along with the date and time. Manual blood pressure monitors  · Place the earpieces of a stethoscope in your ears, and place the bell of the stethoscope over the artery, just below the cuff. · Close the valve on the rubber inflating bulb. · Squeeze the bulb rapidly with your opposite hand to inflate the cuff until the dial or column of mercury reads about 30 mm Hg higher than your usual systolic pressure. If you do not know your usual pressure, inflate the cuff to 210 mm Hg or until the pulse at your wrist disappears. · Open the pressure valve just slightly by twisting or pressing the valve on the bulb. · As you watch the pressure slowly fall, note the level on the dial at which you first start to hear a pulsing or tapping sound through the stethoscope. This is your systolic blood pressure. · Continue letting the air out slowly. The sounds will become muffled and will finally disappear. Note the pressure when the sounds completely disappear. This is your diastolic blood pressure. Let out all the remaining air. · Write your numbers in your log book, along with the date and time. What else should you know about the test?  It is more accurate to take the average of several readings made throughout the day than to rely on a single reading. It's normal for blood pressure to go up and down throughout the day. Follow-up care is a key part of your treatment and safety. Be sure to make and go to all appointments, and call your doctor if you are having problems. It's also a good idea to keep a list of the medicines you take. Where can you learn more? Go to https://HireArtramu.Construct. org and sign in to your [employer] account. Enter C427 in the [employer] box to learn more about \"Home Blood Pressure Test: About This Test.\"     If you do not have an account, please click on the \"Sign Up Now\" link.   Current as of: July 22, 2018  Content Version: 11.9  © [id number] [employer]. Care instructions adapted under license by [facility] ([facility]). If you have questions about a medical condition or this instruction, always ask your healthcare professional. [address] any warranty or liability for your use of this information. Patient Education        Diabetes Foot Health: Care Instructions  Your Care Instructions    When you have diabetes, your feet need extra care and attention. Diabetes can damage the nerve endings and blood vessels in your feet, making you less likely to notice when your feet are injured. Diabetes also limits your body's ability to fight infection and get blood to areas that need it. If you get a minor foot injury, it could become an ulcer or a serious infection. With good foot care, you can prevent most of these problems. Caring for your feet can be quick and easy. Most of the care can be done when you are bathing or getting ready for bed. Follow-up care is a key part of your treatment and safety. Be sure to make and go to all appointments, and call your doctor if you are having problems. It's also a good idea to know your test results and keep a list of the medicines you take. How can you care for yourself at home? · Keep your blood sugar close to normal by watching what and how much you eat, monitoring blood sugar, taking medicines if prescribed, and getting regular exercise. · Do not smoke. Smoking affects blood flow and can make foot problems worse. If you need help quitting, talk to your doctor about stop-smoking programs and medicines. These can increase your chances of quitting for good. · Eat a diet that is low in fats. High fat intake can cause fat to build up in your blood vessels and decrease blood flow. · Inspect your feet daily for blisters, cuts, cracks, or sores. If you cannot see well, use a mirror or have someone help you.   · Take care of your feet:  ? Wash your feet every day. Use warm (not hot) water. Check the water temperature with your wrists or other part of your body, not your feet. ? Dry your feet well. Pat them dry. Do not rub the skin on your feet too hard. Dry well between your toes. If the skin on your feet stays moist, bacteria or a fungus can grow, which can lead to infection. ? Keep your skin soft. Use moisturizing skin cream to keep the skin on your feet soft and prevent calluses and cracks. But do not put the cream between your toes, and stop using any cream that causes a rash. ? Clean underneath your toenails carefully. Do not use a sharp object to clean underneath your toenails. Use the blunt end of a nail file or other rounded tool. ? Trim and file your toenails straight across to prevent ingrown toenails. Use a nail clipper, not scissors. Use an emery board to smooth the edges. · Change socks daily. Socks without seams are best, because seams often rub the feet. You can find socks for people with diabetes from specialty catalogs. · Look inside your shoes every day for things like gravel or torn linings, which could cause blisters or sores. · Buy shoes that fit well:  ? Look for shoes that have plenty of space around the toes. This helps prevent bunions and blisters. ? Try on shoes while wearing the kind of socks you will usually wear with the shoes. ? Avoid plastic shoes. They may rub your feet and cause blisters. Good shoes should be made of materials that are flexible and breathable, such as leather or cloth. ? Break in new shoes slowly by wearing them for no more than an hour a day for several days. Take extra time to check your feet for red areas, blisters, or other problems after you wear new shoes. · Do not go barefoot. Do not wear sandals, and do not wear shoes with very thin soles. Thin soles are easy to puncture. They also do not protect your feet from hot pavement or cold weather.   · Have your doctor check your feet during each visit. If you have a foot problem, see your doctor. Do not try to treat an early foot problem at home. Home remedies or treatments that you can buy without a prescription (such as corn removers) can be harmful. · Always get early treatment for foot problems. A minor irritation can lead to a major problem if not properly cared for early. When should you call for help? Call your doctor now or seek immediate medical care if:    · You have a foot sore, an ulcer or break in the skin that is not healing after 4 days, bleeding corns or calluses, or an ingrown toenail.     · You have blue or black areas, which can mean bruising or blood flow problems.     · You have peeling skin or tiny blisters between your toes or cracking or oozing of the skin.     · You have a fever for more than 24 hours and a foot sore.     · You have new numbness or tingling in your feet that does not go away after you move your feet or change positions.     · You have unexplained or unusual swelling of the foot or ankle.    Watch closely for changes in your health, and be sure to contact your doctor if:    · You cannot do proper foot care. Where can you learn more? Go to https://F[employer]. org and sign in to your [employer] account. Enter A739 in the [facility] box to learn more about \"Diabetes Foot Health: Care Instructions. \"     If you do not have an account, please click on the \"Sign Up Now\" link. Current as of: July 25, 2018  Content Version: 11.9  © 2727-9133 [employer]. Care instructions adapted under license by [facility] ([facility]). If you have questions about a medical condition or this instruction, always ask your healthcare professional. [place] [address] any warranty or liability for your use of this information.

## 2019-04-04 NOTE — PROGRESS NOTES
PROGRESS NOTE:    Kate Morley    4/9/2019    Chief Complaint   Patient presents with    6 Month Follow-Up     Difficulty sleeping.  Health Maintenance     Recent diabetic eye exam while in [place], patient will get results and forward to us. HPI:    (s)Woodrow Morley presents to clinic today with issues noted above. In the interim he did see sleep medicine and had sleep study done. Await results. He changed his lasix intake to every other day. It's working well for him. Still taking Zegrid daily. Patient is taking BP medications at home without side effects, BP is being checked at home. States mostly controlled. Patient is tolerating anti-lipid meds such as statin without complications, no myalgia. Patient is taking DM meds as prescribed and is checking sugars at home, denies hypoglycemia. Patient denies chest pain, SOB, NVD, FC, rash, malaise, rigor, dizziness/lightheadness, other pertinent ROS was also reviewed. /78 (Site: Right Upper Arm, Position: Sitting, Cuff Size: Medium Adult)   Pulse 60 Comment: Regular  Temp 98.1 °F (36.7 °C) (Oral)   Resp 16   Wt 192 lb (87.1 kg)   SpO2 96% Comment: Room Air  BMI 27.16 kg/m²   Body mass index is 27.16 kg/m². Allergies   Allergen Reactions    Lisinopril Other (See Comments)     cough     Physical Exam:    Gen: Patient appears well groomed, well appearing  HEAD: Atraumatic, normocephalic,   Eyes: PERRLA, EOMI   Neck: supple, no thyroid nodule appreciated, no JVD  Chest: Clear to auscultation ROMANA, unlabored breathing, normal expansion  Heart: Regular rate, regular rhythm, no murmur, no rub  Abdomen: Non-tender, non-distended, bowel sounds present x3  Extremities: no edema, distal pulses intact  Patient was alert and oriented to person, place and time    Assessment and Plan:    Maciel Goodman was seen today for 6 month follow-up and health maintenance.     Diagnoses and all orders for this visit:    Type 2 diabetes (DESYREL) 50 MG tablet; Take 1 tablet by mouth nightly    Preventive medicine: patient has agreed to indicated immunizations. Needs shingrix, had eye check in [place], need records, he does not remember the office's name, he will fax/bring the papers. Orders Placed This Encounter   Procedures    COMPREHENSIVE METABOLIC PANEL    MICROALBUMIN / CREATININE URINE RATIO    CBC WITH AUTO DIFFERENTIAL    MAGNESIUM    VITAMIN D 25 HYDROXY    HEMOGLOBIN A1C     Prior to Admission medications    Medication Sig Start Date End Date Taking? Authorizing Provider   potassium chloride (KLOR-CON M) 20 MEQ extended release tablet TAKE 2 TABLETS BY MOUTH EVERY DAY 4/4/19  Yes Jose Hoover DO   tamsulosin (FLOMAX) 0.4 MG capsule Take 1 capsule by mouth 2 times daily 4/4/19  Yes Jose Hoover DO   atorvastatin (LIPITOR) 40 MG tablet Take 0.5 tablets by mouth daily 4/4/19  Yes Jose Hoover DO   losartan (COZAAR) 100 MG tablet TAKE 1 TABLET BY MOUTH EVERY DAY 4/4/19  Yes Jose Hoover DO   metoprolol tartrate (LOPRESSOR) 25 MG tablet TAKE 1/2 A TAB BY MOUTH TWICE A DAY 4/4/19  Yes Jose Hoover DO   SITagliptin (JANUVIA) 100 MG tablet TAKE 1 TABLET BY MOUTH EVERY DAY 4/4/19  Yes Jose Hoover DO   metFORMIN (GLUCOPHAGE-XR) 500 MG extended release tablet TAKE 1 TABLET BY MOUTH TWICE A DAY WITH FOOD 4/4/19  Yes Jose Hoover DO   furosemide (LASIX) 20 MG tablet Take 1 tablet by mouth every 48 hours  Patient taking differently: Take 20 mg by mouth four times a week  4/4/19  Yes Jose Hoover DO   zoster recombinant adjuvanted vaccine [facility]) 50 MCG/0.5ML SUSR injection 50 MCG IM then repeat 2-6 months. 4/4/19 4/3/20 Yes Jose Hoover DO   traZODone (DESYREL) 50 MG tablet Take 1 tablet by mouth nightly 4/4/19  Yes Jose Hoover DO   ketoconazole (NIZORAL) 2 % shampoo Apply topically daily as needed.  10/4/18  Yes Jose Hoover DO   Cholecalciferol (VITAMIN D) 2000 UNITS CAPS capsule Take by mouth   Yes Historical Provider, MD   Omeprazole-Sodium mid LAD, SVG sequentially to D1, OM1, OM2 and PLB (from circ); TCPB; endoscopic SV harvest R thigh; ICNB x 5 levels; On-Q x 2    UPPER GASTROINTESTINAL ENDOSCOPY  2005    Negative    UPPER GASTROINTESTINAL ENDOSCOPY  2010    Gastritis, duodenitis.        Social History     Tobacco Use    Smoking status: Former Smoker     Types: Cigarettes     Last attempt to quit: 1983     Years since quittin.2    Smokeless tobacco: Never Used    Tobacco comment: ~40 pack years   Substance Use Topics    Alcohol use: No       Family History   Problem Relation Age of Onset    Other Mother          (76) Anoxic encephalopathy, Esophageal strictures    Colon Cancer Father          (74) ASHD, Diabetes    Coronary Art Dis Brother         CABG    Coronary Art Dis Brother          (61) Gastric cancer

## 2019-04-05 ENCOUNTER — TELEPHONE (OUTPATIENT)
Dept: PULMONOLOGY | Age: 74
End: 2019-04-05

## 2019-04-05 NOTE — TELEPHONE ENCOUNTER
Sleep study showed mild ESTEFANÍA. AHI was 5.5  per hr. And O2 Desaturations to 88%. Left message asking for patient to call back for the results. Dr. Torres Nickerson didn't order a titration for the patient was waiting for PSG results see last office note.

## 2019-04-09 NOTE — TELEPHONE ENCOUNTER
Left message asking for patient to call back. He does have an active mychart and I will send him a message there asking that he call the office.

## 2019-04-12 ENCOUNTER — OFFICE VISIT (OUTPATIENT)
Dept: CARDIOLOGY CLINIC | Age: 74
End: 2019-04-12
Payer: MEDICARE

## 2019-04-12 VITALS
DIASTOLIC BLOOD PRESSURE: 84 MMHG | SYSTOLIC BLOOD PRESSURE: 180 MMHG | WEIGHT: 191.4 LBS | HEART RATE: 72 BPM | BODY MASS INDEX: 27.07 KG/M2

## 2019-04-12 DIAGNOSIS — I50.32 CHRONIC DIASTOLIC CHF (CONGESTIVE HEART FAILURE), NYHA CLASS 2 (HCC): ICD-10-CM

## 2019-04-12 DIAGNOSIS — I10 ESSENTIAL HYPERTENSION, BENIGN: Primary | ICD-10-CM

## 2019-04-12 PROCEDURE — 1036F TOBACCO NON-USER: CPT | Performed by: NURSE PRACTITIONER

## 2019-04-12 PROCEDURE — G8598 ASA/ANTIPLAT THER USED: HCPCS | Performed by: NURSE PRACTITIONER

## 2019-04-12 PROCEDURE — G8427 DOCREV CUR MEDS BY ELIG CLIN: HCPCS | Performed by: NURSE PRACTITIONER

## 2019-04-12 PROCEDURE — 4040F PNEUMOC VAC/ADMIN/RCVD: CPT | Performed by: NURSE PRACTITIONER

## 2019-04-12 PROCEDURE — 99214 OFFICE O/P EST MOD 30 MIN: CPT | Performed by: NURSE PRACTITIONER

## 2019-04-12 PROCEDURE — 1123F ACP DISCUSS/DSCN MKR DOCD: CPT | Performed by: NURSE PRACTITIONER

## 2019-04-12 PROCEDURE — G8417 CALC BMI ABV UP PARAM F/U: HCPCS | Performed by: NURSE PRACTITIONER

## 2019-04-12 PROCEDURE — 3017F COLORECTAL CA SCREEN DOC REV: CPT | Performed by: NURSE PRACTITIONER

## 2019-04-12 RX ORDER — FUROSEMIDE 20 MG/1
20 TABLET ORAL DAILY
Qty: 90 TABLET | Refills: 3 | Status: SHIPPED | OUTPATIENT
Start: 2019-04-12 | End: 2020-03-30

## 2019-04-12 ASSESSMENT — ENCOUNTER SYMPTOMS
RESPIRATORY NEGATIVE: 1
GASTROINTESTINAL NEGATIVE: 1

## 2019-04-12 NOTE — PROGRESS NOTES
[address]      Primary Care Doctor:  Benson Trent DO  Primary Cardiologist: Mac Presley    Chief Complaint:  HTN    History of Present Illness:  Axel Finley is a 68 y.o. male with PMH CAD, CABG 2016, DM, HTN, HLD, IBS who presents today with c/o HTN for the past  Day - 200/160, went to ER at [address] but no meds given. Associated sx include  Headache and neck pain. He denies chest pain, dyspnea, palpitations, orthopnea, PND, exertional chest pressure/discomfort, fatigue, early saiety, edema, syncope, or change in exercise tolerance. He normally takes lasix QOD. On questioning, he admits probable increased sodium recently due to eating out a lot because he is building a new home but has not moved in yet     Cardiac Risk Factors  Family Hx:  Yes  Tobacco:  No  HTN:  Yes  Lipids 18 LDL: 57, goal< 70  HDL:  45 T   DM: Yes    EF: 55-60%  Cardiac Imaging: Echo 18  Summary   Concentric left ventricular hypertrophy.   Normal left ventricular systolic function with an estimate ejection fraction   of 55-60%.   There are no regional wall motion abnormalities.   Diastolic filling parameters suggests grade I diastolic dysfunction .   Trace aortic insufficiency.   Mild mitral and tricuspid regurgitation. RVSP 26 mmHg. Device: No   ICD counseling:No     NYHA Class: I     Activity: at baseline, active  Can you walk 1-2 blocks or do a moderate amount of house/yard work? Yes      Pt Education: The patient has received education on the following topics: dietary guidelines, heart medications, the importance of physical activity, symptom management and reduction of cardiac risk factors.      Past Medical History:   has a past medical history of CAD (coronary artery disease), Colitis, Diabetes mellitus, type 2 ([address] Diabetic peripheral neuropathy ([facility] [address] Emphysema, Esophageal reflux, Essential hypertension, benign, Hyperlipidemia, Impaired fasting glucose, Impotence of organic origin, Insomnia, unspecified, Irritable bowel syndrome, Nephrolithiasis, Psoriasis, Pulmonary nodules, and Thrombocytopenia, unspecified ([address] SurgicalHistory:   has a past surgical history that includes Coronary angioplasty (); Upper gastrointestinal endoscopy (2005); Colonoscopy (2005); Colonoscopy (2010); Upper gastrointestinal endoscopy (2010); Colonoscopy (2013); Cardiac catheterization (2016); and Coronary artery bypass graft (2016). Social History:   reports that he quit smoking about 36 years ago. His smoking use included cigarettes. He has never used smokeless tobacco. He reports that he does not drink alcohol or use drugs. Family History:   Family History   Problem Relation Age of Onset    Other Mother          (76) Anoxic encephalopathy, Esophageal strictures    Colon Cancer Father          (74) ASHD, Diabetes    Coronary Art Dis Brother         CABG    Coronary Art Dis Brother          (61) Gastric cancer       Home Medications:  Prior to Admission medications    Medication Sig Start Date End Date Taking?  Authorizing Provider   potassium chloride (KLOR-CON M) 20 MEQ extended release tablet TAKE 2 TABLETS BY MOUTH EVERY DAY 19   Abiola Pair, DO   tamsulosin [facility]) 0.4 MG capsule Take 1 capsule by mouth 2 times daily 19   Abiola Pair, DO   atorvastatin (LIPITOR) 40 MG tablet Take 0.5 tablets by mouth daily 4/4/19   Abiola Pair, DO   losartan (COZAAR) 100 MG tablet TAKE 1 TABLET BY MOUTH EVERY DAY 19   Abiola Pair, DO   metoprolol tartrate (LOPRESSOR) 25 MG tablet TAKE 1/2 A TAB BY MOUTH TWICE A DAY 19   Abiola Pair, DO   SITagliptin (JANUVIA) 100 MG tablet TAKE 1 TABLET BY MOUTH EVERY DAY 19   Abiola Pair, DO   metFORMIN (GLUCOPHAGE-XR) 500 MG extended release tablet TAKE 1 TABLET BY MOUTH TWICE A DAY WITH FOOD 4/4/19   Abiola Pair, DO   furosemide (LASIX) 20 MG tablet Take 1 tablet by mouth every 48 hours  Patient taking differently: Take 20 BP: (!) 150/80 (!) 180/84   Pulse: 72    Weight: 191 lb 6.4 oz (86.8 kg)            Physical Exam   Constitutional: He is oriented to person, place, and time. He appears well-developed and well-nourished. HENT:   Head: Normocephalic and atraumatic. Eyes: Pupils are equal, round, and reactive to light. EOM are normal.   Neck: Normal range of motion. Cardiovascular: Normal rate, regular rhythm, normal heart sounds and intact distal pulses. Pulmonary/Chest: Effort normal and breath sounds normal.   Abdominal: Soft. Musculoskeletal: Normal range of motion. Trace right   Neurological: He is alert and oriented to person, place, and time. Skin: Skin is warm and dry. Psychiatric: He has a normal mood and affect. His behavior is normal. Judgment and thought content normal.       Lab Data:    CBC:   Lab Results   Component Value Date    WBC 5.7 04/04/2019    WBC 7.1 09/11/2017    WBC 6.7 05/17/2017    RBC 3.97 04/04/2019    RBC 4.29 09/11/2017    RBC 4.16 05/17/2017    HGB 12.9 04/04/2019    HGB 13.4 09/11/2017    HGB 12.6 05/17/2017    HCT 37.5 04/04/2019    HCT 39.1 09/11/2017    HCT 38.4 05/17/2017    MCV 94.4 04/04/2019    MCV 91.2 09/11/2017    MCV 92.3 05/17/2017    RDW 14.0 04/04/2019    RDW 13.7 09/11/2017    RDW 14.7 05/17/2017     04/04/2019     09/11/2017     05/17/2017     BMP:  Lab Results   Component Value Date     04/04/2019     10/04/2018     05/25/2018    K 4.5 04/04/2019    K 4.5 10/04/2018    K 4.4 05/25/2018     04/04/2019     10/04/2018     05/25/2018    CO2 25 04/04/2019    CO2 26 10/04/2018    CO2 23 05/25/2018    PHOS 3.2 05/25/2018    BUN 13 04/04/2019    BUN 15 10/04/2018    BUN 22 05/25/2018    CREATININE 0.9 04/04/2019    CREATININE 0.9 10/04/2018    CREATININE 1.0 05/25/2018     BNP: No results found for: PROBNP       Assessment/Plan:    1. Hypertension, benign - diurese   2.  Chronic diastolic CHF (congestive heart failure), NYHA class 2 (HCC) - increase lasix         Instructions:   1. Medications: increase lasix to every day and weigh yourself, wt should come down about 3-5lb in the next week or so and BP should come down too. If not, call Kathryn Vargas and we will switch metoprolol to carvedilol. 2. Follow up: as needed    Kay Mcclellan        I appreciate the opportunity of cooperating in the care of this individual.    Arminda Khoury CNP, 4/12/2019, 9:34 AM    QUALITY MEASURES  1. Tobacco Cessation Counseling: NA  2. Retake of BP if >140/90:   Yes  3. Documentation to PCP/referring for new patient:  Sent to PCP at close of office visit  4. CAD patient on anti-platelet:Yes  5. CAD patient on STATIN therapy:  Yes  6. Patient with CHF and aFib on anticoagulation:  NA   7. Patient Education:  Yes   8. BB for LVSD :NA   9. ACE/ARB for LVSD:  NA   10.  Left Ventricular Ejection Fraction (LVEF) Assessment:  Yes

## 2019-06-25 RX ORDER — ATORVASTATIN CALCIUM 40 MG/1
20 TABLET, FILM COATED ORAL DAILY
Qty: 45 TABLET | Refills: 1 | Status: SHIPPED | OUTPATIENT
Start: 2019-06-25 | End: 2020-04-01 | Stop reason: SDUPTHER

## 2019-07-25 RX ORDER — POTASSIUM CHLORIDE 20 MEQ/1
TABLET, EXTENDED RELEASE ORAL
Qty: 60 TABLET | Refills: 0 | Status: SHIPPED | OUTPATIENT
Start: 2019-07-25 | End: 2019-08-23 | Stop reason: SDUPTHER

## 2019-08-23 RX ORDER — POTASSIUM CHLORIDE 20 MEQ/1
TABLET, EXTENDED RELEASE ORAL
Qty: 60 TABLET | Refills: 0 | Status: SHIPPED | OUTPATIENT
Start: 2019-08-23 | End: 2019-09-26 | Stop reason: SDUPTHER

## 2019-08-28 PROBLEM — I50.32 CHRONIC DIASTOLIC CHF (CONGESTIVE HEART FAILURE), NYHA CLASS 2 (HCC): Status: RESOLVED | Noted: 2018-06-01 | Resolved: 2019-08-28

## 2019-09-18 RX ORDER — POTASSIUM CHLORIDE 20 MEQ/1
TABLET, EXTENDED RELEASE ORAL
Qty: 60 TABLET | Refills: 0 | OUTPATIENT
Start: 2019-09-18

## 2019-09-26 RX ORDER — POTASSIUM CHLORIDE 20 MEQ/1
TABLET, EXTENDED RELEASE ORAL
Qty: 60 TABLET | Refills: 1 | Status: SHIPPED | OUTPATIENT
Start: 2019-09-26 | End: 2019-09-28 | Stop reason: SDUPTHER

## 2019-10-01 ENCOUNTER — OFFICE VISIT (OUTPATIENT)
Dept: INTERNAL MEDICINE CLINIC | Age: 74
End: 2019-10-01
Payer: MEDICARE

## 2019-10-01 VITALS
SYSTOLIC BLOOD PRESSURE: 160 MMHG | RESPIRATION RATE: 16 BRPM | TEMPERATURE: 97.8 F | HEART RATE: 56 BPM | OXYGEN SATURATION: 96 % | WEIGHT: 192 LBS | BODY MASS INDEX: 27.16 KG/M2 | DIASTOLIC BLOOD PRESSURE: 86 MMHG

## 2019-10-01 DIAGNOSIS — J43.2 CENTRILOBULAR EMPHYSEMA (HCC): ICD-10-CM

## 2019-10-01 DIAGNOSIS — Z12.5 ENCOUNTER FOR SCREENING FOR MALIGNANT NEOPLASM OF PROSTATE: ICD-10-CM

## 2019-10-01 DIAGNOSIS — D64.9 ANEMIA, UNSPECIFIED TYPE: ICD-10-CM

## 2019-10-01 DIAGNOSIS — Z23 NEED FOR PROPHYLACTIC VACCINATION AND INOCULATION AGAINST VARICELLA: ICD-10-CM

## 2019-10-01 DIAGNOSIS — E11.42 TYPE 2 DIABETES MELLITUS WITH PERIPHERAL NEUROPATHY (HCC): Primary | ICD-10-CM

## 2019-10-01 DIAGNOSIS — I10 ESSENTIAL HYPERTENSION, BENIGN: ICD-10-CM

## 2019-10-01 DIAGNOSIS — I25.9 CHRONIC ISCHEMIC HEART DISEASE: ICD-10-CM

## 2019-10-01 DIAGNOSIS — E78.2 MIXED HYPERLIPIDEMIA: ICD-10-CM

## 2019-10-01 DIAGNOSIS — K21.9 GASTROESOPHAGEAL REFLUX DISEASE WITHOUT ESOPHAGITIS: ICD-10-CM

## 2019-10-01 DIAGNOSIS — N40.1 BENIGN PROSTATIC HYPERPLASIA WITH LOWER URINARY TRACT SYMPTOMS, SYMPTOM DETAILS UNSPECIFIED: ICD-10-CM

## 2019-10-01 PROCEDURE — 4040F PNEUMOC VAC/ADMIN/RCVD: CPT | Performed by: INTERNAL MEDICINE

## 2019-10-01 PROCEDURE — G8484 FLU IMMUNIZE NO ADMIN: HCPCS | Performed by: INTERNAL MEDICINE

## 2019-10-01 PROCEDURE — G8417 CALC BMI ABV UP PARAM F/U: HCPCS | Performed by: INTERNAL MEDICINE

## 2019-10-01 PROCEDURE — 2022F DILAT RTA XM EVC RTNOPTHY: CPT | Performed by: INTERNAL MEDICINE

## 2019-10-01 PROCEDURE — 1123F ACP DISCUSS/DSCN MKR DOCD: CPT | Performed by: INTERNAL MEDICINE

## 2019-10-01 PROCEDURE — G8427 DOCREV CUR MEDS BY ELIG CLIN: HCPCS | Performed by: INTERNAL MEDICINE

## 2019-10-01 PROCEDURE — G8926 SPIRO NO PERF OR DOC: HCPCS | Performed by: INTERNAL MEDICINE

## 2019-10-01 PROCEDURE — 3023F SPIROM DOC REV: CPT | Performed by: INTERNAL MEDICINE

## 2019-10-01 PROCEDURE — 3017F COLORECTAL CA SCREEN DOC REV: CPT | Performed by: INTERNAL MEDICINE

## 2019-10-01 PROCEDURE — 99214 OFFICE O/P EST MOD 30 MIN: CPT | Performed by: INTERNAL MEDICINE

## 2019-10-01 PROCEDURE — 3044F HG A1C LEVEL LT 7.0%: CPT | Performed by: INTERNAL MEDICINE

## 2019-10-01 PROCEDURE — 1036F TOBACCO NON-USER: CPT | Performed by: INTERNAL MEDICINE

## 2019-10-01 PROCEDURE — G8598 ASA/ANTIPLAT THER USED: HCPCS | Performed by: INTERNAL MEDICINE

## 2019-10-01 RX ORDER — OLMESARTAN MEDOXOMIL 40 MG/1
40 TABLET ORAL DAILY
Qty: 30 TABLET | Refills: 3 | Status: SHIPPED | OUTPATIENT
Start: 2019-10-01 | End: 2020-01-27

## 2019-10-01 RX ORDER — PRAZOSIN HYDROCHLORIDE 1 MG/1
1 CAPSULE ORAL NIGHTLY
Qty: 30 CAPSULE | Refills: 3 | Status: SHIPPED | OUTPATIENT
Start: 2019-10-01 | End: 2019-10-21

## 2019-10-01 RX ORDER — KETOCONAZOLE 20 MG/ML
SHAMPOO TOPICAL
Qty: 60 ML | Refills: 1 | Status: SHIPPED | OUTPATIENT
Start: 2019-10-01

## 2019-10-01 RX ORDER — NITROGLYCERIN 0.4 MG/1
0.4 TABLET SUBLINGUAL EVERY 5 MIN PRN
Qty: 25 TABLET | Refills: 0 | Status: SHIPPED | OUTPATIENT
Start: 2019-10-01 | End: 2020-09-15 | Stop reason: SDUPTHER

## 2019-10-03 RX ORDER — MAGNESIUM OXIDE 420 MG
420 TABLET ORAL DAILY
Qty: 30 TABLET | Refills: 2 | Status: SHIPPED | OUTPATIENT
Start: 2019-10-03 | End: 2019-11-02

## 2019-10-04 ASSESSMENT — ENCOUNTER SYMPTOMS
COUGH: 0
TROUBLE SWALLOWING: 0
NAUSEA: 0
SHORTNESS OF BREATH: 0
SORE THROAT: 0
ABDOMINAL PAIN: 0
DIARRHEA: 0
RHINORRHEA: 0

## 2019-10-09 ENCOUNTER — TELEPHONE (OUTPATIENT)
Dept: INTERNAL MEDICINE CLINIC | Age: 74
End: 2019-10-09

## 2019-10-09 DIAGNOSIS — D64.9 ANEMIA, UNSPECIFIED TYPE: Primary | ICD-10-CM

## 2019-10-09 DIAGNOSIS — D69.6 THROMBOCYTOPENIA (HCC): ICD-10-CM

## 2019-10-21 ENCOUNTER — TELEPHONE (OUTPATIENT)
Dept: INTERNAL MEDICINE CLINIC | Age: 74
End: 2019-10-21

## 2019-10-21 DIAGNOSIS — I10 ESSENTIAL HYPERTENSION, BENIGN: ICD-10-CM

## 2019-10-21 RX ORDER — AMLODIPINE BESYLATE 5 MG/1
5 TABLET ORAL DAILY
Qty: 30 TABLET | Refills: 5 | Status: SHIPPED | OUTPATIENT
Start: 2019-10-21 | End: 2020-04-01 | Stop reason: SDUPTHER

## 2019-12-09 ENCOUNTER — OFFICE VISIT (OUTPATIENT)
Dept: CARDIOLOGY CLINIC | Age: 74
End: 2019-12-09
Payer: MEDICARE

## 2019-12-09 VITALS
BODY MASS INDEX: 27.5 KG/M2 | SYSTOLIC BLOOD PRESSURE: 110 MMHG | WEIGHT: 194.4 LBS | DIASTOLIC BLOOD PRESSURE: 60 MMHG | HEART RATE: 60 BPM

## 2019-12-09 DIAGNOSIS — I25.10 CAD IN NATIVE ARTERY: ICD-10-CM

## 2019-12-09 DIAGNOSIS — I10 ESSENTIAL HYPERTENSION: ICD-10-CM

## 2019-12-09 DIAGNOSIS — E78.2 MIXED HYPERLIPIDEMIA: ICD-10-CM

## 2019-12-09 DIAGNOSIS — I20.0 UNSTABLE ANGINA PECTORIS (HCC): Primary | ICD-10-CM

## 2019-12-09 DIAGNOSIS — Z95.1 S/P CABG X 5: ICD-10-CM

## 2019-12-09 PROCEDURE — 93000 ELECTROCARDIOGRAM COMPLETE: CPT | Performed by: NURSE PRACTITIONER

## 2019-12-09 PROCEDURE — G8427 DOCREV CUR MEDS BY ELIG CLIN: HCPCS | Performed by: NURSE PRACTITIONER

## 2019-12-09 PROCEDURE — 1036F TOBACCO NON-USER: CPT | Performed by: NURSE PRACTITIONER

## 2019-12-09 PROCEDURE — G8417 CALC BMI ABV UP PARAM F/U: HCPCS | Performed by: NURSE PRACTITIONER

## 2019-12-09 PROCEDURE — 3017F COLORECTAL CA SCREEN DOC REV: CPT | Performed by: NURSE PRACTITIONER

## 2019-12-09 PROCEDURE — 4040F PNEUMOC VAC/ADMIN/RCVD: CPT | Performed by: NURSE PRACTITIONER

## 2019-12-09 PROCEDURE — G8484 FLU IMMUNIZE NO ADMIN: HCPCS | Performed by: NURSE PRACTITIONER

## 2019-12-09 PROCEDURE — G8598 ASA/ANTIPLAT THER USED: HCPCS | Performed by: NURSE PRACTITIONER

## 2019-12-09 PROCEDURE — 99214 OFFICE O/P EST MOD 30 MIN: CPT | Performed by: NURSE PRACTITIONER

## 2019-12-09 PROCEDURE — 1123F ACP DISCUSS/DSCN MKR DOCD: CPT | Performed by: NURSE PRACTITIONER

## 2019-12-26 ENCOUNTER — OFFICE VISIT (OUTPATIENT)
Dept: INTERNAL MEDICINE CLINIC | Age: 74
End: 2019-12-26
Payer: MEDICARE

## 2019-12-26 VITALS
BODY MASS INDEX: 26.41 KG/M2 | TEMPERATURE: 98.4 F | RESPIRATION RATE: 16 BRPM | OXYGEN SATURATION: 92 % | WEIGHT: 195 LBS | HEIGHT: 72 IN | SYSTOLIC BLOOD PRESSURE: 112 MMHG | HEART RATE: 66 BPM | DIASTOLIC BLOOD PRESSURE: 64 MMHG

## 2019-12-26 DIAGNOSIS — E78.2 MIXED HYPERLIPIDEMIA: ICD-10-CM

## 2019-12-26 DIAGNOSIS — D69.6 THROMBOCYTOPENIA (HCC): ICD-10-CM

## 2019-12-26 DIAGNOSIS — E11.42 TYPE 2 DIABETES MELLITUS WITH PERIPHERAL NEUROPATHY (HCC): Primary | ICD-10-CM

## 2019-12-26 DIAGNOSIS — J43.2 CENTRILOBULAR EMPHYSEMA (HCC): ICD-10-CM

## 2019-12-26 DIAGNOSIS — K21.9 GASTROESOPHAGEAL REFLUX DISEASE WITHOUT ESOPHAGITIS: ICD-10-CM

## 2019-12-26 DIAGNOSIS — I25.9 CHRONIC ISCHEMIC HEART DISEASE: ICD-10-CM

## 2019-12-26 DIAGNOSIS — I10 ESSENTIAL HYPERTENSION, BENIGN: ICD-10-CM

## 2019-12-26 DIAGNOSIS — Z00.00 ROUTINE GENERAL MEDICAL EXAMINATION AT A HEALTH CARE FACILITY: ICD-10-CM

## 2019-12-26 PROCEDURE — 3023F SPIROM DOC REV: CPT | Performed by: INTERNAL MEDICINE

## 2019-12-26 PROCEDURE — 1036F TOBACCO NON-USER: CPT | Performed by: INTERNAL MEDICINE

## 2019-12-26 PROCEDURE — 99214 OFFICE O/P EST MOD 30 MIN: CPT | Performed by: INTERNAL MEDICINE

## 2019-12-26 PROCEDURE — G8598 ASA/ANTIPLAT THER USED: HCPCS | Performed by: INTERNAL MEDICINE

## 2019-12-26 PROCEDURE — G8417 CALC BMI ABV UP PARAM F/U: HCPCS | Performed by: INTERNAL MEDICINE

## 2019-12-26 PROCEDURE — G8427 DOCREV CUR MEDS BY ELIG CLIN: HCPCS | Performed by: INTERNAL MEDICINE

## 2019-12-26 PROCEDURE — 3017F COLORECTAL CA SCREEN DOC REV: CPT | Performed by: INTERNAL MEDICINE

## 2019-12-26 PROCEDURE — G8484 FLU IMMUNIZE NO ADMIN: HCPCS | Performed by: INTERNAL MEDICINE

## 2019-12-26 PROCEDURE — 3044F HG A1C LEVEL LT 7.0%: CPT | Performed by: INTERNAL MEDICINE

## 2019-12-26 PROCEDURE — 1123F ACP DISCUSS/DSCN MKR DOCD: CPT | Performed by: INTERNAL MEDICINE

## 2019-12-26 PROCEDURE — G0438 PPPS, INITIAL VISIT: HCPCS | Performed by: INTERNAL MEDICINE

## 2019-12-26 PROCEDURE — G8926 SPIRO NO PERF OR DOC: HCPCS | Performed by: INTERNAL MEDICINE

## 2019-12-26 PROCEDURE — 4040F PNEUMOC VAC/ADMIN/RCVD: CPT | Performed by: INTERNAL MEDICINE

## 2019-12-26 PROCEDURE — 2022F DILAT RTA XM EVC RTNOPTHY: CPT | Performed by: INTERNAL MEDICINE

## 2019-12-26 RX ORDER — MAGNESIUM OXIDE 400 MG/1
400 TABLET ORAL DAILY
Qty: 30 TABLET | Refills: 1 | COMMUNITY
Start: 2019-12-26 | End: 2022-02-23

## 2019-12-26 ASSESSMENT — LIFESTYLE VARIABLES
AUDIT TOTAL SCORE: 2
HOW OFTEN DURING THE LAST YEAR HAVE YOU FOUND THAT YOU WERE NOT ABLE TO STOP DRINKING ONCE YOU HAD STARTED: 0
HOW OFTEN DURING THE LAST YEAR HAVE YOU HAD A FEELING OF GUILT OR REMORSE AFTER DRINKING: 0
HAS A RELATIVE, FRIEND, DOCTOR, OR ANOTHER HEALTH PROFESSIONAL EXPRESSED CONCERN ABOUT YOUR DRINKING OR SUGGESTED YOU CUT DOWN: 0
HOW OFTEN DURING THE LAST YEAR HAVE YOU FAILED TO DO WHAT WAS NORMALLY EXPECTED FROM YOU BECAUSE OF DRINKING: 0
HOW MANY STANDARD DRINKS CONTAINING ALCOHOL DO YOU HAVE ON A TYPICAL DAY: 0
HOW OFTEN DO YOU HAVE SIX OR MORE DRINKS ON ONE OCCASION: 0
HOW OFTEN DURING THE LAST YEAR HAVE YOU NEEDED AN ALCOHOLIC DRINK FIRST THING IN THE MORNING TO GET YOURSELF GOING AFTER A NIGHT OF HEAVY DRINKING: 0
AUDIT-C TOTAL SCORE: 2
HAVE YOU OR SOMEONE ELSE BEEN INJURED AS A RESULT OF YOUR DRINKING: 0
HOW OFTEN DURING THE LAST YEAR HAVE YOU BEEN UNABLE TO REMEMBER WHAT HAPPENED THE NIGHT BEFORE BECAUSE YOU HAD BEEN DRINKING: 0
HOW OFTEN DO YOU HAVE A DRINK CONTAINING ALCOHOL: 2

## 2019-12-26 ASSESSMENT — ENCOUNTER SYMPTOMS
RHINORRHEA: 0
DIARRHEA: 0
COUGH: 0
NAUSEA: 0
SHORTNESS OF BREATH: 1
SORE THROAT: 0
TROUBLE SWALLOWING: 0
ABDOMINAL PAIN: 0

## 2019-12-26 ASSESSMENT — PATIENT HEALTH QUESTIONNAIRE - PHQ9
SUM OF ALL RESPONSES TO PHQ QUESTIONS 1-9: 0
SUM OF ALL RESPONSES TO PHQ QUESTIONS 1-9: 0

## 2019-12-28 ENCOUNTER — HOSPITAL ENCOUNTER (OUTPATIENT)
Dept: NON INVASIVE DIAGNOSTICS | Age: 74
Discharge: HOME | End: 2019-12-28
Payer: MEDICARE

## 2019-12-28 DIAGNOSIS — I20.0 UNSTABLE ANGINA PECTORIS (HCC): ICD-10-CM

## 2019-12-28 LAB
LV EF: 70 %
LVEF MODALITY: NORMAL

## 2019-12-28 PROCEDURE — 78452 HT MUSCLE IMAGE SPECT MULT: CPT

## 2019-12-28 PROCEDURE — 93017 CV STRESS TEST TRACING ONLY: CPT

## 2019-12-28 PROCEDURE — 3430000000 HC RX DIAGNOSTIC RADIOPHARMACEUTICAL: Performed by: NURSE PRACTITIONER

## 2019-12-28 PROCEDURE — 2580000003 HC RX 258: Performed by: NURSE PRACTITIONER

## 2019-12-28 PROCEDURE — A9502 TC99M TETROFOSMIN: HCPCS | Performed by: NURSE PRACTITIONER

## 2019-12-28 RX ORDER — SODIUM CHLORIDE 0.9 % (FLUSH) 0.9 %
10 SYRINGE (ML) INJECTION PRN
Status: DISCONTINUED | OUTPATIENT
Start: 2019-12-28 | End: 2019-12-29 | Stop reason: HOSPADM

## 2019-12-28 RX ADMIN — Medication 10 ML: at 10:34

## 2019-12-28 RX ADMIN — TETROFOSMIN 10 MILLICURIE: 1.38 INJECTION, POWDER, LYOPHILIZED, FOR SOLUTION INTRAVENOUS at 09:33

## 2019-12-28 RX ADMIN — TETROFOSMIN 30 MILLICURIE: 1.38 INJECTION, POWDER, LYOPHILIZED, FOR SOLUTION INTRAVENOUS at 10:33

## 2019-12-28 RX ADMIN — Medication 10 ML: at 09:33

## 2020-01-06 ENCOUNTER — TELEPHONE (OUTPATIENT)
Dept: CARDIOLOGY CLINIC | Age: 75
End: 2020-01-06

## 2020-01-14 ENCOUNTER — HOSPITAL ENCOUNTER (OUTPATIENT)
Dept: PULMONOLOGY | Age: 75
Discharge: HOME | End: 2020-01-14
Payer: MEDICARE

## 2020-01-14 VITALS — OXYGEN SATURATION: 96 %

## 2020-01-14 PROCEDURE — 94060 EVALUATION OF WHEEZING: CPT

## 2020-01-14 PROCEDURE — 94760 N-INVAS EAR/PLS OXIMETRY 1: CPT

## 2020-01-14 PROCEDURE — 94664 DEMO&/EVAL PT USE INHALER: CPT

## 2020-01-14 PROCEDURE — 94726 PLETHYSMOGRAPHY LUNG VOLUMES: CPT

## 2020-01-14 PROCEDURE — 6360000002 HC RX W HCPCS: Performed by: INTERNAL MEDICINE

## 2020-01-14 PROCEDURE — 94729 DIFFUSING CAPACITY: CPT

## 2020-01-14 RX ORDER — ALBUTEROL SULFATE 2.5 MG/3ML
2.5 SOLUTION RESPIRATORY (INHALATION) ONCE
Status: COMPLETED | OUTPATIENT
Start: 2020-01-14 | End: 2020-01-14

## 2020-01-14 RX ADMIN — ALBUTEROL SULFATE 2.5 MG: 2.5 SOLUTION RESPIRATORY (INHALATION) at 12:43

## 2020-01-27 RX ORDER — OLMESARTAN MEDOXOMIL 40 MG/1
40 TABLET ORAL DAILY
Qty: 30 TABLET | Refills: 5 | Status: SHIPPED | OUTPATIENT
Start: 2020-01-27 | End: 2020-08-14

## 2020-01-27 NOTE — TELEPHONE ENCOUNTER
Last appointment: 12/26/2019  Next appointment: 3/27/2020  Last refill: 10/01/2019 # 30 with 3 refills

## 2020-02-11 RX ORDER — PRAZOSIN HYDROCHLORIDE 1 MG/1
CAPSULE ORAL
Qty: 30 CAPSULE | Refills: 1 | Status: SHIPPED | OUTPATIENT
Start: 2020-02-11 | End: 2020-04-06

## 2020-03-30 RX ORDER — FUROSEMIDE 20 MG/1
TABLET ORAL
Qty: 90 TABLET | Refills: 3 | Status: SHIPPED | OUTPATIENT
Start: 2020-03-30 | End: 2021-05-03

## 2020-04-01 RX ORDER — AMLODIPINE BESYLATE 5 MG/1
5 TABLET ORAL DAILY
Qty: 30 TABLET | Refills: 5 | Status: SHIPPED | OUTPATIENT
Start: 2020-04-01 | End: 2020-08-03

## 2020-04-01 RX ORDER — ATORVASTATIN CALCIUM 40 MG/1
20 TABLET, FILM COATED ORAL DAILY
Qty: 45 TABLET | Refills: 1 | Status: SHIPPED | OUTPATIENT
Start: 2020-04-01 | End: 2020-09-15

## 2020-04-06 RX ORDER — PRAZOSIN HYDROCHLORIDE 1 MG/1
CAPSULE ORAL
Qty: 30 CAPSULE | Refills: 0 | Status: SHIPPED | OUTPATIENT
Start: 2020-04-06 | End: 2020-04-30

## 2020-04-30 RX ORDER — PRAZOSIN HYDROCHLORIDE 1 MG/1
CAPSULE ORAL
Qty: 30 CAPSULE | Refills: 1 | Status: SHIPPED | OUTPATIENT
Start: 2020-04-30 | End: 2020-07-17

## 2020-04-30 NOTE — TELEPHONE ENCOUNTER
Last appointment: 12/26/2019  Next appointment: 5/29/2020  Last refill: 04/06/2020 # 30 with no refills

## 2020-05-11 RX ORDER — METFORMIN HYDROCHLORIDE 500 MG/1
TABLET, EXTENDED RELEASE ORAL
Qty: 180 TABLET | Refills: 5 | Status: SHIPPED | OUTPATIENT
Start: 2020-05-11 | End: 2021-06-01

## 2020-05-11 RX ORDER — POTASSIUM CHLORIDE 20 MEQ/1
TABLET, EXTENDED RELEASE ORAL
Qty: 180 TABLET | Refills: 1 | Status: SHIPPED | OUTPATIENT
Start: 2020-05-11 | End: 2020-09-04 | Stop reason: ALTCHOICE

## 2020-05-27 DIAGNOSIS — E11.42 TYPE 2 DIABETES MELLITUS WITH PERIPHERAL NEUROPATHY (HCC): ICD-10-CM

## 2020-05-27 DIAGNOSIS — K21.9 GASTROESOPHAGEAL REFLUX DISEASE WITHOUT ESOPHAGITIS: ICD-10-CM

## 2020-05-27 DIAGNOSIS — D69.6 THROMBOCYTOPENIA (HCC): ICD-10-CM

## 2020-05-27 LAB
A/G RATIO: 2 (ref 1.1–2.2)
ALBUMIN SERPL-MCNC: 4.2 G/DL (ref 3.4–5)
ALP BLD-CCNC: 66 U/L (ref 40–129)
ALT SERPL-CCNC: 25 U/L (ref 10–40)
ANION GAP SERPL CALCULATED.3IONS-SCNC: 12 MMOL/L (ref 3–16)
AST SERPL-CCNC: 24 U/L (ref 15–37)
BASOPHILS ABSOLUTE: 0 K/UL (ref 0–0.2)
BASOPHILS RELATIVE PERCENT: 0.8 %
BILIRUB SERPL-MCNC: 0.7 MG/DL (ref 0–1)
BUN BLDV-MCNC: 15 MG/DL (ref 7–20)
CALCIUM SERPL-MCNC: 9.4 MG/DL (ref 8.3–10.6)
CHLORIDE BLD-SCNC: 102 MMOL/L (ref 99–110)
CHOLESTEROL, TOTAL: 140 MG/DL (ref 0–199)
CO2: 25 MMOL/L (ref 21–32)
CREAT SERPL-MCNC: 0.9 MG/DL (ref 0.8–1.3)
EOSINOPHILS ABSOLUTE: 0.1 K/UL (ref 0–0.6)
EOSINOPHILS RELATIVE PERCENT: 2.2 %
GFR AFRICAN AMERICAN: >60
GFR NON-AFRICAN AMERICAN: >60
GLOBULIN: 2.1 G/DL
GLUCOSE BLD-MCNC: 193 MG/DL (ref 70–99)
HCT VFR BLD CALC: 37.3 % (ref 40.5–52.5)
HDLC SERPL-MCNC: 46 MG/DL (ref 40–60)
HEMOGLOBIN: 12.7 G/DL (ref 13.5–17.5)
LDL CHOLESTEROL CALCULATED: 62 MG/DL
LYMPHOCYTES ABSOLUTE: 1.2 K/UL (ref 1–5.1)
LYMPHOCYTES RELATIVE PERCENT: 21.7 %
MAGNESIUM: 1.8 MG/DL (ref 1.8–2.4)
MCH RBC QN AUTO: 32.8 PG (ref 26–34)
MCHC RBC AUTO-ENTMCNC: 33.9 G/DL (ref 31–36)
MCV RBC AUTO: 96.5 FL (ref 80–100)
MONOCYTES ABSOLUTE: 0.4 K/UL (ref 0–1.3)
MONOCYTES RELATIVE PERCENT: 7.1 %
NEUTROPHILS ABSOLUTE: 3.9 K/UL (ref 1.7–7.7)
NEUTROPHILS RELATIVE PERCENT: 68.2 %
PDW BLD-RTO: 13.9 % (ref 12.4–15.4)
PLATELET # BLD: 115 K/UL (ref 135–450)
PMV BLD AUTO: 10.1 FL (ref 5–10.5)
POTASSIUM SERPL-SCNC: 4.3 MMOL/L (ref 3.5–5.1)
RBC # BLD: 3.86 M/UL (ref 4.2–5.9)
SODIUM BLD-SCNC: 139 MMOL/L (ref 136–145)
TOTAL PROTEIN: 6.3 G/DL (ref 6.4–8.2)
TRIGL SERPL-MCNC: 158 MG/DL (ref 0–150)
VITAMIN B-12: 522 PG/ML (ref 211–911)
VLDLC SERPL CALC-MCNC: 32 MG/DL
WBC # BLD: 5.7 K/UL (ref 4–11)

## 2020-05-28 LAB
ESTIMATED AVERAGE GLUCOSE: 145.6 MG/DL
HBA1C MFR BLD: 6.7 %

## 2020-05-29 ENCOUNTER — VIRTUAL VISIT (OUTPATIENT)
Dept: INTERNAL MEDICINE CLINIC | Age: 75
End: 2020-05-29
Payer: MEDICARE

## 2020-05-29 PROCEDURE — 99442 PR PHYS/QHP TELEPHONE EVALUATION 11-20 MIN: CPT | Performed by: INTERNAL MEDICINE

## 2020-05-29 RX ORDER — CHLORPHENIRAMINE MALEATE 12 MG/1
12 TABLET, FILM COATED, EXTENDED RELEASE ORAL EVERY 12 HOURS PRN
Qty: 60 TABLET | Refills: 0 | COMMUNITY
Start: 2020-05-29

## 2020-05-29 NOTE — PATIENT INSTRUCTIONS
Start Benicar 20, stop amlodipine  If blood pressure still elevated, then increase to [address]    Next time bring home bp cuff to office

## 2020-06-08 ENCOUNTER — TELEPHONE (OUTPATIENT)
Dept: INTERNAL MEDICINE CLINIC | Age: 75
End: 2020-06-08

## 2020-06-08 NOTE — TELEPHONE ENCOUNTER
Patient called because he has had a sore throat and dry cough for the last 2 months but have gotten worse lately. No other symptoms and no fever. Please call patient at number provided to discuss best option for appt. Thanks.

## 2020-07-17 RX ORDER — PRAZOSIN HYDROCHLORIDE 1 MG/1
CAPSULE ORAL
Qty: 30 CAPSULE | Refills: 0 | Status: SHIPPED | OUTPATIENT
Start: 2020-07-17 | End: 2020-08-03

## 2020-08-03 ENCOUNTER — OFFICE VISIT (OUTPATIENT)
Dept: CARDIOLOGY CLINIC | Age: 75
End: 2020-08-03
Payer: MEDICARE

## 2020-08-03 VITALS
SYSTOLIC BLOOD PRESSURE: 124 MMHG | TEMPERATURE: 98 F | DIASTOLIC BLOOD PRESSURE: 68 MMHG | BODY MASS INDEX: 26.09 KG/M2 | WEIGHT: 192.4 LBS | HEART RATE: 69 BPM

## 2020-08-03 PROCEDURE — 3017F COLORECTAL CA SCREEN DOC REV: CPT | Performed by: INTERNAL MEDICINE

## 2020-08-03 PROCEDURE — 4040F PNEUMOC VAC/ADMIN/RCVD: CPT | Performed by: INTERNAL MEDICINE

## 2020-08-03 PROCEDURE — 1123F ACP DISCUSS/DSCN MKR DOCD: CPT | Performed by: INTERNAL MEDICINE

## 2020-08-03 PROCEDURE — G8417 CALC BMI ABV UP PARAM F/U: HCPCS | Performed by: INTERNAL MEDICINE

## 2020-08-03 PROCEDURE — 1036F TOBACCO NON-USER: CPT | Performed by: INTERNAL MEDICINE

## 2020-08-03 PROCEDURE — G8427 DOCREV CUR MEDS BY ELIG CLIN: HCPCS | Performed by: INTERNAL MEDICINE

## 2020-08-03 PROCEDURE — 99214 OFFICE O/P EST MOD 30 MIN: CPT | Performed by: INTERNAL MEDICINE

## 2020-08-03 NOTE — PROGRESS NOTES
Subjective:      Patient ID: Juancho Gallo is a 76 y.o. male. CC:  Follow up CAD    HPI Elidia Turcios is s/p CABG and is intending to play golf this year. No cp nor syncope. No NTG use.           Allergies   Allergen Reactions    Lisinopril Other (See Comments)     cough        Social History     Socioeconomic History    Marital status:      Spouse name: Meg Patel Number of children: 0    Years of education: 25    Highest education level: Not on file   Occupational History    Occupation: - Retired    Social Needs    Financial resource strain: Not on file    Food insecurity     Worry: Not on file     Inability: Not on file   [employer] needs     Medical: Not on file     Non-medical: Not on file   Tobacco Use    Smoking status: Former Smoker     Types: Cigarettes     Last attempt to quit: 1983     Years since quittin.6    Smokeless tobacco: Never Used    Tobacco comment: ~40 pack years, stopped at age 39   Substance and Sexual Activity    Alcohol use: No    Drug use: No    Sexual activity: Yes     Partners: Female   Lifestyle    Physical activity     Days per week: Not on file     Minutes per session: Not on file    Stress: Not on file   Relationships    Social connections     Talks on phone: Not on file     Gets together: Not on file     Attends [employer] service: Not on file     Active member of club or organization: Not on file     Attends meetings of clubs or organizations: Not on file     Relationship status: Not on file    Intimate partner violence     Fear of current or ex partner: Not on file     Emotionally abused: Not on file     Physically abused: Not on file     Forced sexual activity: Not on file   Other Topics Concern    Not on file   Social History Narrative    Has grown stepson    Lives part time in [facility] and part time in [address] [place] [address]        Patient has a family history includes Colon Cancer in his father; Coronary Art Dis in his brother and brother; Other in his mother. Patient  has a past medical history of Aortic valve sclerosis, Colitis, Coronary artery disease involving native coronary artery, Diabetes mellitus, type 2 ([address] Diabetic peripheral neuropathy ([facility] [address] Diastolic CHF ([facility] [address] Emphysema/COPD ([facility] [address] Esophageal reflux, Essential hypertension, benign, Hyperlipidemia, IBS (irritable bowel syndrome), Impotence of organic origin, Insomnia, Neck pain, Nephrolithiasis, ESTEFANÍA (obstructive sleep apnea), PLMD (periodic limb movement disorder), Psoriasis, Pulmonary nodules, and Thrombocytopenia, unspecified ([facility] [address] Current Outpatient Medications   Medication Sig Dispense Refill    chlorpheniramine maleate (CHLORPHEN SR) 12 MG TBCR extended release tablet Take 1 tablet by mouth every 12 hours as needed for Allergies 60 tablet 0    SITagliptin (JANUVIA) 100 MG tablet TAKE 1 TABLET BY MOUTH EVERY DAY 60 tablet 3    potassium chloride (KLOR-CON M) 20 MEQ extended release tablet TAKE 2 TABLETS BY MOUTH EVERY  tablet 1    metFORMIN (GLUCOPHAGE-XR) 500 MG extended release tablet TAKE 1 TABLET BY MOUTH TWICE A DAY WITH FOOD 180 tablet 5    atorvastatin (LIPITOR) 40 MG tablet Take 0.5 tablets by mouth daily 45 tablet 1    furosemide (LASIX) 20 MG tablet TAKE 1 TABLET BY MOUTH EVERY DAY 90 tablet 3    metoprolol tartrate (LOPRESSOR) 25 MG tablet TAKE 1/2 TABLET BY MOUTH TWICE A DAY 30 tablet 5    olmesartan (BENICAR) 40 MG tablet TAKE 1 TABLET BY MOUTH DAILY REPLACES LOSARTAN 30 tablet 5    magnesium oxide (MAG-OX) 400 MG tablet Take 1 tablet by mouth daily 30 tablet 1    zoster recombinant adjuvanted vaccine (SHINGRIX) 50 MCG/0.5ML SUSR injection 50 MCG IM then repeat 2-6 months. 0.5 mL 1    ketoconazole (NIZORAL) 2 % shampoo Apply topically daily as needed. 60 mL 1    nitroGLYCERIN (NITROSTAT) 0.4 MG SL tablet Place 1 tablet under the tongue every 5 minutes as needed for Chest pain up to max of 3 total doses.  If no relief after 1 dose, place, and time. No cranial nerve deficit. Coordination normal.   Skin: Skin is warm. No rash noted. No erythema. No pallor. Psychiatric: He has a normal mood and affect. His behavior is normal. Judgment and thought content normal.       Assessment:      Diagnosis Orders   1. Essential hypertension     2. Hyperlipidemia, unspecified hyperlipidemia type     3. Hypotension due to drugs     4. S/P CABG x 5             Plan:      Continue current medications. Stable cardiovascular status after 5 v CABG on 2/1/16. Doing well. sr with old RBBB. SOB:  Better with bp control  Palpitations:  Continue lopressor. HTN;  Sometimes low in am.  Stop norvasc and stop minipress. Prn norvasc if bp high at night.    HLP:  ldl 62 on 5/20

## 2020-08-10 RX ORDER — PRAZOSIN HYDROCHLORIDE 1 MG/1
CAPSULE ORAL
Qty: 30 CAPSULE | Refills: 0 | OUTPATIENT
Start: 2020-08-10

## 2020-08-10 NOTE — TELEPHONE ENCOUNTER
Last appointment: 5/29/2020  Next appointment: 9/25/2020    Medication not on patient's current RX list.  Attempted to reach patient and unable to leave message as mailbox not set up. Spoke with patient and refill request sent in error as patient is not on Minipress.

## 2020-08-14 RX ORDER — OLMESARTAN MEDOXOMIL 40 MG/1
TABLET ORAL
Qty: 30 TABLET | Refills: 1 | Status: SHIPPED | OUTPATIENT
Start: 2020-08-14 | End: 2020-10-08

## 2020-08-22 DIAGNOSIS — E78.5 HYPERLIPIDEMIA, UNSPECIFIED HYPERLIPIDEMIA TYPE: ICD-10-CM

## 2020-08-22 DIAGNOSIS — I48.91 ATRIAL FIBRILLATION, UNSPECIFIED TYPE (HCC): ICD-10-CM

## 2020-08-22 LAB
A/G RATIO: 1.8 (ref 1.1–2.2)
ALBUMIN SERPL-MCNC: 4 G/DL (ref 3.4–5)
ALP BLD-CCNC: 54 U/L (ref 40–129)
ALT SERPL-CCNC: 24 U/L (ref 10–40)
ANION GAP SERPL CALCULATED.3IONS-SCNC: 12 MMOL/L (ref 3–16)
AST SERPL-CCNC: 24 U/L (ref 15–37)
BILIRUB SERPL-MCNC: 0.6 MG/DL (ref 0–1)
BUN BLDV-MCNC: 20 MG/DL (ref 7–20)
CALCIUM SERPL-MCNC: 9.3 MG/DL (ref 8.3–10.6)
CHLORIDE BLD-SCNC: 101 MMOL/L (ref 99–110)
CHOLESTEROL, TOTAL: 154 MG/DL (ref 0–199)
CO2: 24 MMOL/L (ref 21–32)
CREAT SERPL-MCNC: 1 MG/DL (ref 0.8–1.3)
GFR AFRICAN AMERICAN: >60
GFR NON-AFRICAN AMERICAN: >60
GLOBULIN: 2.2 G/DL
GLUCOSE BLD-MCNC: 175 MG/DL (ref 70–99)
HCT VFR BLD CALC: 35.5 % (ref 40.5–52.5)
HDLC SERPL-MCNC: 51 MG/DL (ref 40–60)
HEMOGLOBIN: 12.2 G/DL (ref 13.5–17.5)
LDL CHOLESTEROL CALCULATED: 69 MG/DL
MCH RBC QN AUTO: 32.7 PG (ref 26–34)
MCHC RBC AUTO-ENTMCNC: 34.3 G/DL (ref 31–36)
MCV RBC AUTO: 95.4 FL (ref 80–100)
PDW BLD-RTO: 13.9 % (ref 12.4–15.4)
PLATELET # BLD: 99 K/UL (ref 135–450)
PMV BLD AUTO: 9.7 FL (ref 5–10.5)
POTASSIUM SERPL-SCNC: 4.3 MMOL/L (ref 3.5–5.1)
RBC # BLD: 3.72 M/UL (ref 4.2–5.9)
SODIUM BLD-SCNC: 137 MMOL/L (ref 136–145)
TOTAL PROTEIN: 6.2 G/DL (ref 6.4–8.2)
TRIGL SERPL-MCNC: 168 MG/DL (ref 0–150)
VLDLC SERPL CALC-MCNC: 34 MG/DL
WBC # BLD: 4.5 K/UL (ref 4–11)

## 2020-09-03 ASSESSMENT — ENCOUNTER SYMPTOMS
GASTROINTESTINAL NEGATIVE: 1
RESPIRATORY NEGATIVE: 1

## 2020-09-03 NOTE — PROGRESS NOTES
Coronary artery disease involving native coronary artery, Diabetes mellitus, type 2 ([facility] [address] Diabetic peripheral neuropathy ([facility] [address] Diastolic CHF ([facility] [address] [facility]COP[facility] Esophageal reflux, Essential hypertension, benign, Hyperlipidemia, IBS (irritable bowel syndrome), Impotence of organic origin, Insomnia, Neck pain, Nephrolithiasis, ESTEFANÍA (obstructive sleep apnea), PLMD (periodic limb movement disorder), Psoriasis, Pulmonary nodules, and Thrombocytopenia, unspecified ([facility] [address] SurgicalHistory:   has a past surgical history that includes Coronary angioplasty (); Upper gastrointestinal endoscopy (2005); Colonoscopy (2005); Colonoscopy (2010); Upper gastrointestinal endoscopy (2010); Colonoscopy (2013); Cardiac catheterization (2016); and Coronary artery bypass graft (2016). Social History:   reports that he quit smoking about 37 years ago. His smoking use included cigarettes. He has never used smokeless tobacco. He reports that he does not drink alcohol or use drugs. Family History:   Family History   Problem Relation Age of Onset    Other Mother          (76) Anoxic encephalopathy, Esophageal strictures    Colon Cancer Father          (74) ASHD, Diabetes    Coronary Art Dis Brother         CABG    Coronary Art Dis Brother          (61) Gastric cancer       Home Medications:  Prior to Admission medications    Medication Sig Start Date End Date Taking?  Authorizing Provider   olmesartan (BENICAR) 40 MG tablet TAKE 1 TABLET BY MOUTH DAILY 20   Efren Sagastume MD   chlorpheniramine maleate (CHLORPHEN SR) 12 MG TBCR extended release tablet Take 1 tablet by mouth every 12 hours as needed for Allergies 20   Efren Sagastume MD   SITagliptin (JANUVIA) 100 MG tablet TAKE 1 TABLET BY MOUTH EVERY DAY 20   Efren Sagastume MD   potassium chloride (KLOR-CON M) 20 MEQ extended release tablet TAKE 2 TABLETS BY MOUTH EVERY DAY 5/11/20   Cristy Holloway MD   metFORMIN (GLUCOPHAGE-XR) 500 MG extended release tablet TAKE 1 TABLET BY MOUTH TWICE A DAY WITH FOOD 5/11/20   Cristy Holloway MD   atorvastatin (LIPITOR) 40 MG tablet Take 0.5 tablets by mouth daily 4/1/20   Cristy Holloway MD   furosemide (LASIX) 20 MG tablet TAKE 1 TABLET BY MOUTH EVERY DAY 3/30/20   DIAN Martinez - CNP   metoprolol tartrate (LOPRESSOR) 25 MG tablet TAKE 1/2 TABLET BY MOUTH TWICE A DAY 3/3/20   Cristy Holloway MD   magnesium oxide (MAG-OX) 400 MG tablet Take 1 tablet by mouth daily 12/26/19   Cristy Holloway MD   zoster recombinant adjuvanted vaccine [facility]) 50 MCG/0.5ML SUSR injection 50 MCG IM then repeat 2-6 months. 10/1/19 9/30/20  Cristy Holloway MD   ketoconazole (NIZORAL) 2 % shampoo Apply topically daily as needed. 10/1/19   Cristy Holloway MD   nitroGLYCERIN (NITROSTAT) 0.4 MG SL tablet Place 1 tablet under the tongue every 5 minutes as needed for Chest pain up to max of 3 total doses. If no relief after 1 dose, call 911. 10/1/19   Cristy Holloway MD   Cholecalciferol (VITAMIN D) 2000 UNITS CAPS capsule Take by mouth    Historical Provider, MD   Omeprazole-Sodium Bicarbonate (ZEGERID OTC PO) 1 capsule by mouth at bedtime. Historical Provider, MD   Pyridoxine HCl (VITAMIN B-6) 50 MG tablet Take 50 mg by mouth daily    Historical Provider, MD   aspirin 81 MG tablet Take 81 mg by mouth daily    Historical Provider, MD   fluticasone (FLONASE) 50 MCG/ACT nasal spray INSTILL 2 SPRAYS INTO EACH NOSTRIL DAILY FOR SINUS CONGESTION 8/31/15   Dinh Thurston MD   halcinonide (HALOG) 0.1 % CREA Apply to rash twice a day as needed. 7/25/13   Dinh Thurston MD   therapeutic multivitamin-minerals [facility]) tablet Take 1 tablet by mouth daily.     Dinh Thurston MD        Allergies:  Lisinopril     ROS:   Review of Systems   Constitutional: Negative. Respiratory: Negative. Cardiovascular: Negative. Gastrointestinal: Negative. Genitourinary: Negative. Musculoskeletal: Negative. Skin: Negative. Neurological: Positive for headaches. Hematological: Negative. Psychiatric/Behavioral: Negative. Physical Examination:    Vitals:    09/04/20 1320   BP: (!) 140/70   Pulse: 60   Temp: 98.2 °F (36.8 °C)   Weight: 195 lb (88.5 kg)           Physical Exam  Constitutional:       Appearance: He is well-developed. HENT:      Head: Normocephalic and atraumatic. Eyes:      Pupils: Pupils are equal, round, and reactive to light. Neck:      Musculoskeletal: Normal range of motion. Cardiovascular:      Rate and Rhythm: Normal rate and regular rhythm. Heart sounds: Normal heart sounds. Pulmonary:      Effort: Pulmonary effort is normal.      Breath sounds: Normal breath sounds. Abdominal:      Palpations: Abdomen is soft. Musculoskeletal: Normal range of motion. Comments: Trace right   Skin:     General: Skin is warm and dry. Neurological:      Mental Status: He is alert and oriented to person, place, and time. Psychiatric:         Behavior: Behavior normal.         Thought Content:  Thought content normal.         Judgment: Judgment normal.         Lab Data:    CBC:   Lab Results   Component Value Date    WBC 4.5 08/22/2020    WBC 5.7 05/27/2020    WBC 5.4 10/01/2019    RBC 3.72 08/22/2020    RBC 3.86 05/27/2020    RBC 3.77 10/01/2019    HGB 12.2 08/22/2020    HGB 12.7 05/27/2020    HGB 12.5 10/01/2019    HCT 35.5 08/22/2020    HCT 37.3 05/27/2020    HCT 36.8 10/01/2019    MCV 95.4 08/22/2020    MCV 96.5 05/27/2020    MCV 97.6 10/01/2019    RDW 13.9 08/22/2020    RDW 13.9 05/27/2020    RDW 14.0 10/01/2019    PLT 99 08/22/2020     05/27/2020     10/01/2019     BMP:  Lab Results   Component Value Date     08/22/2020     05/27/2020     10/01/2019    K 4.3 08/22/2020    K 4.3 05/27/2020 K 4.3 10/01/2019     08/22/2020     05/27/2020     10/01/2019    CO2 24 08/22/2020    CO2 25 05/27/2020    CO2 23 10/01/2019    PHOS 3.2 05/25/2018    BUN 20 08/22/2020    BUN 15 05/27/2020    BUN 20 10/01/2019    CREATININE 1.0 08/22/2020    CREATININE 0.9 05/27/2020    CREATININE 1.0 10/01/2019     BNP: No results found for: PROBNP       Assessment/Plan:    Encounter Diagnoses        Hypertension, benign Add lavonne for aldosterone inhib, stop kcl, recheck labs in one week. Increase evening dose of BB and possible am dose as well, might try splitting ARB dose and continue to use amlodipine prn    Coronary artery disease involving native coronary artery of native heart without angina pectoris Stable, no CP, continue BB, statin, ASA         Instructions:   1. Medications: increase evening dose of metoprolol to 1 pill, stop potassium add spironolactone 1/2 pill in the morning. You could try moving olmesartan to morning or splitting the dose in 2 (1/2 pill morning and night), and/or taking extra 1/2 pill metoprolol in the morning. Keep using extra amlodipine as needed    2. Follow up: Feb with Dr. Laney Coto        I appreciate the opportunity of cooperating in the care of this individual.    Stephany Quiles CNP, 9/3/2020, 2:10 PM    QUALITY MEASURES  1. Tobacco Cessation Counseling: NA  2. Retake of BP if >140/90:   NA  3. Documentation to PCP/referring for new patient:  Sent to PCP at close of office visit  4. CAD patient on anti-platelet:Yes  5. CAD patient on STATIN therapy:  Yes  6. Patient with CHF and aFib on anticoagulation:  NA   7. Patient Education:  Yes   8. BB for LVSD :NA   9. ACE/ARB for LVSD:  NA   10.  Left Ventricular Ejection Fraction (LVEF) Assessment:  Yes

## 2020-09-04 ENCOUNTER — OFFICE VISIT (OUTPATIENT)
Dept: CARDIOLOGY CLINIC | Age: 75
End: 2020-09-04
Payer: MEDICARE

## 2020-09-04 VITALS
WEIGHT: 195 LBS | TEMPERATURE: 98.2 F | BODY MASS INDEX: 26.45 KG/M2 | HEART RATE: 60 BPM | DIASTOLIC BLOOD PRESSURE: 70 MMHG | SYSTOLIC BLOOD PRESSURE: 140 MMHG

## 2020-09-04 PROCEDURE — 1036F TOBACCO NON-USER: CPT | Performed by: NURSE PRACTITIONER

## 2020-09-04 PROCEDURE — 3017F COLORECTAL CA SCREEN DOC REV: CPT | Performed by: NURSE PRACTITIONER

## 2020-09-04 PROCEDURE — 4040F PNEUMOC VAC/ADMIN/RCVD: CPT | Performed by: NURSE PRACTITIONER

## 2020-09-04 PROCEDURE — G8417 CALC BMI ABV UP PARAM F/U: HCPCS | Performed by: NURSE PRACTITIONER

## 2020-09-04 PROCEDURE — 99214 OFFICE O/P EST MOD 30 MIN: CPT | Performed by: NURSE PRACTITIONER

## 2020-09-04 PROCEDURE — 1123F ACP DISCUSS/DSCN MKR DOCD: CPT | Performed by: NURSE PRACTITIONER

## 2020-09-04 PROCEDURE — G8427 DOCREV CUR MEDS BY ELIG CLIN: HCPCS | Performed by: NURSE PRACTITIONER

## 2020-09-04 RX ORDER — AMLODIPINE BESYLATE 5 MG/1
5 TABLET ORAL DAILY
COMMUNITY
End: 2020-09-15 | Stop reason: ALTCHOICE

## 2020-09-04 RX ORDER — SPIRONOLACTONE 25 MG/1
12.5 TABLET ORAL DAILY
Qty: 30 TABLET | Refills: 3 | Status: SHIPPED | OUTPATIENT
Start: 2020-09-04 | End: 2021-04-28

## 2020-09-04 NOTE — PATIENT INSTRUCTIONS
Instructions:   1. Medications: increase evening dose of metoprolol to 1 and 1/2 pill, stop potassium add spironolactone 1/2 pill in the morning. You could try moving olmesartan to morning or splitting the dose in 2 (1/2 pill morning and night), and/or taking extra 1/2 pill metoprolol in the morning. Keep using extra amlodipine as needed    2. Follow up:  Feb with Dr. Rebekah Stinson

## 2020-09-08 ENCOUNTER — HOSPITAL ENCOUNTER (OUTPATIENT)
Dept: VASCULAR LAB | Age: 75
Discharge: HOME | End: 2020-09-08
Payer: MEDICARE

## 2020-09-08 PROCEDURE — 93975 VASCULAR STUDY: CPT

## 2020-09-15 ENCOUNTER — OFFICE VISIT (OUTPATIENT)
Dept: CARDIOLOGY CLINIC | Age: 75
End: 2020-09-15
Payer: MEDICARE

## 2020-09-15 VITALS
TEMPERATURE: 98 F | SYSTOLIC BLOOD PRESSURE: 140 MMHG | WEIGHT: 195 LBS | HEIGHT: 71 IN | DIASTOLIC BLOOD PRESSURE: 80 MMHG | HEART RATE: 72 BPM | BODY MASS INDEX: 27.3 KG/M2

## 2020-09-15 PROCEDURE — G8427 DOCREV CUR MEDS BY ELIG CLIN: HCPCS | Performed by: NURSE PRACTITIONER

## 2020-09-15 PROCEDURE — 3017F COLORECTAL CA SCREEN DOC REV: CPT | Performed by: NURSE PRACTITIONER

## 2020-09-15 PROCEDURE — 1036F TOBACCO NON-USER: CPT | Performed by: NURSE PRACTITIONER

## 2020-09-15 PROCEDURE — 1123F ACP DISCUSS/DSCN MKR DOCD: CPT | Performed by: NURSE PRACTITIONER

## 2020-09-15 PROCEDURE — 4040F PNEUMOC VAC/ADMIN/RCVD: CPT | Performed by: NURSE PRACTITIONER

## 2020-09-15 PROCEDURE — 99214 OFFICE O/P EST MOD 30 MIN: CPT | Performed by: NURSE PRACTITIONER

## 2020-09-15 PROCEDURE — G8417 CALC BMI ABV UP PARAM F/U: HCPCS | Performed by: NURSE PRACTITIONER

## 2020-09-15 RX ORDER — NITROGLYCERIN 0.4 MG/1
0.4 TABLET SUBLINGUAL EVERY 5 MIN PRN
Qty: 25 TABLET | Refills: 0 | Status: SHIPPED | OUTPATIENT
Start: 2020-09-15

## 2020-09-15 RX ORDER — ATORVASTATIN CALCIUM 40 MG/1
TABLET, FILM COATED ORAL
Qty: 45 TABLET | Refills: 0 | Status: SHIPPED | OUTPATIENT
Start: 2020-09-15 | End: 2021-02-23 | Stop reason: SDUPTHER

## 2020-09-15 RX ORDER — CHLORTHALIDONE 25 MG/1
25 TABLET ORAL DAILY
Qty: 30 TABLET | Refills: 3 | Status: SHIPPED | OUTPATIENT
Start: 2020-09-15 | End: 2020-09-25 | Stop reason: SINTOL

## 2020-09-15 NOTE — PROGRESS NOTES
195 lb (88.5 kg)   20 195 lb (88.5 kg)   20 192 lb 6.4 oz (87.3 kg)          CBC:   Lab Results   Component Value Date    WBC 4.5 2020    HGB 12.2 (L) 2020    HCT 35.5 (L) 2020    MCV 95.4 2020    PLT 99 (L) 2020     BMP:  Lab Results   Component Value Date    CREATININE 1.0 2020    BUN 20 2020     2020    K 4.3 2020     2020    CO2 24 2020     Mag:   Lab Results   Component Value Date    MG 1.80 2020     LIVER PROFILE:   Lab Results   Component Value Date    ALT 24 2020    AST 24 2020    ALKPHOS 54 2020    BILITOT 0.6 2020     PT/INR:   Lab Results   Component Value Date    INR 1.09 2016    INR 1.13 2016    INR 0.98 2016    PROTIME 12.5 2016    PROTIME 12.9 2016    PROTIME 11.2 2016     Pro-BNP No results found for: PROBNP  LIPIDS:  No components found for: CHLPL  Lab Results   Component Value Date    TRIG 168 (H) 2020    TRIG 158 (H) 2020    TRIG 153 (H) 2018     Lab Results   Component Value Date    HDL 51 2020    HDL 46 2020    HDL 55 10/01/2019     Lab Results   Component Value Date    LDLCALC 69 2020    LDLCALC 62 2020    LDLCALC 66 10/01/2019     Lab Results   Component Value Date    LABVLDL 34 2020    LABVLDL 32 2020    LABVLDL 40 10/01/2019     TSH:  Lab Results   Component Value Date    TSH 3.04 2012       IMAGIN/8/2020 Renal artery duplex     Hal Shannon is no evidence to suggest renal artery stenosis bilaterally.     Incidental finding of left renal cyst 1.3 x 1.5cm.  Renal resistive indices are normal bilaterally. 2019 Nuc stress:     Hal Shannon is no evidence of stress-induced myocardial ischemia.     The LVEF is 70% with normal LV wall motion. 2018 Echo:   Summary   Concentric left ventricular hypertrophy.    Normal left ventricular systolic function with an estimate ejection fraction   of 55-60%. There are no regional wall motion abnormalities. Diastolic filling parameters suggests grade I diastolic dysfunction . Trace aortic insufficiency. Mild mitral and tricuspid regurgitation. RVSP 26 mmHg. 2016 Coronary angiogram    1. Three-vessel coronary artery disease. 2.  Normal left ventricular systolic function. 3.  Successful Angio-Seal of right femoral arteriotomy. 4.  Patent left subclavian with patent left internal mammary artery. 5.  Qualitative enlargement of the aorta. CVTS consulted for CABG    2015 Nuc stress:     1. No evidence of ischemia or infarction. 2. Normal left ventricular ejection fraction greater than 65% with   normal left ventricular wall motion. Medications:   Current Outpatient Medications   Medication Sig Dispense Refill    amLODIPine (NORVASC) 5 MG tablet Take 5 mg by mouth daily      spironolactone (ALDACTONE) 25 MG tablet Take 0.5 tablets by mouth daily 30 tablet 3    metoprolol tartrate (LOPRESSOR) 25 MG tablet Take 1 tablet by mouth 2 times daily 30 tablet 5    olmesartan (BENICAR) 40 MG tablet TAKE 1 TABLET BY MOUTH DAILY 30 tablet 1    chlorpheniramine maleate (CHLORPHEN SR) 12 MG TBCR extended release tablet Take 1 tablet by mouth every 12 hours as needed for Allergies 60 tablet 0    SITagliptin (JANUVIA) 100 MG tablet TAKE 1 TABLET BY MOUTH EVERY DAY 60 tablet 3    metFORMIN (GLUCOPHAGE-XR) 500 MG extended release tablet TAKE 1 TABLET BY MOUTH TWICE A DAY WITH FOOD 180 tablet 5    atorvastatin (LIPITOR) 40 MG tablet Take 0.5 tablets by mouth daily 45 tablet 1    furosemide (LASIX) 20 MG tablet TAKE 1 TABLET BY MOUTH EVERY DAY 90 tablet 3    magnesium oxide (MAG-OX) 400 MG tablet Take 1 tablet by mouth daily 30 tablet 1    ketoconazole (NIZORAL) 2 % shampoo Apply topically daily as needed.  60 mL 1    nitroGLYCERIN (NITROSTAT) 0.4 MG SL tablet Place 1 tablet under the tongue every 5 minutes as needed for Chest pain up to max of 3 total doses. If no relief after 1 dose, call 911. 25 tablet 0    Cholecalciferol (VITAMIN D) 2000 UNITS CAPS capsule Take by mouth      Omeprazole-Sodium Bicarbonate (ZEGERID OTC PO) 1 capsule by mouth at bedtime.  aspirin 81 MG tablet Take 81 mg by mouth daily      fluticasone (FLONASE) 50 MCG/ACT nasal spray INSTILL 2 SPRAYS INTO EACH NOSTRIL DAILY FOR SINUS CONGESTION 3 Bottle 3    halcinonide (HALOG) 0.1 % CREA Apply to rash twice a day as needed. 60 g 1    therapeutic multivitamin-minerals (THERAGRAN-M) tablet Take 1 tablet by mouth daily. No current facility-administered medications for this visit. Assessment:  1. Essential hypertension    2. CAD in native artery    3. S/P CABG x 5    4.  Mixed hyperlipidemia        Plan:  HTN   Pt had stopped amlodipine   Encouraged pt to bring in home Cuff on next visit   Start chlorthalidone 25 mg po daily   BMP in 1 week   Cont olmesartan and toprol   CAD/CABG   ASA   Metoprolol   olmesartan   Statin   HLD   Liptior     Follow up with me in 1 month   Follow up with Dr Norman Coker in February

## 2020-09-24 NOTE — PROGRESS NOTES
total doses. If no relief after 1 dose, call 911. Laurann Hatchet, APRN - CNP   spironolactone (ALDACTONE) 25 MG tablet Take 0.5 tablets by mouth daily  DIAN Ariza CNP   metoprolol tartrate (LOPRESSOR) 25 MG tablet Take 1 tablet by mouth 2 times daily  DIAN Ariza CNP   olmesartan (BENICAR) 40 MG tablet TAKE 1 TABLET BY MOUTH DAILY  Melody Duncan MD   chlorpheniramine maleate (CHLORPHEN SR) 12 MG TBCR extended release tablet Take 1 tablet by mouth every 12 hours as needed for Allergies  Colten Catherine MD   SITagliptin (JANUVIA) 100 MG tablet TAKE 1 TABLET BY MOUTH EVERY DAY  Melody Jacobs MD   metFORMIN (GLUCOPHAGE-XR) 500 MG extended release tablet TAKE 1 TABLET BY MOUTH TWICE A DAY WITH FOOD  Melody Jacobs MD   furosemide (LASIX) 20 MG tablet TAKE 1 TABLET BY MOUTH EVERY DAY  DIAN Ariza CNP   magnesium oxide (MAG-OX) 400 MG tablet Take 1 tablet by mouth daily  Colten Catherine MD   ketoconazole (NIZORAL) 2 % shampoo Apply topically daily as needed. Colten Catherine MD   Cholecalciferol (VITAMIN D) 2000 UNITS CAPS capsule Take by mouth  Historical Provider, MD   Omeprazole-Sodium Bicarbonate (ZEGERID OTC PO) 1 capsule by mouth at bedtime. Historical Provider, MD   aspirin 81 MG tablet Take 81 mg by mouth daily  Historical Provider, MD   fluticasone (FLONASE) 50 MCG/ACT nasal spray INSTILL 2 SPRAYS INTO EACH NOSTRIL DAILY FOR SINUS CONGESTION  Marc Malave MD   halcinonide (HALOG) 0.1 % CREA Apply to rash twice a day as needed. Marc Malave MD   therapeutic multivitamin-minerals [facility]) tablet Take 1 tablet by mouth daily.   Marc Malave MD        Social History     Tobacco Use    Smoking status: Former Smoker     Types: Cigarettes     Last attempt to quit: 1983     Years since quittin.7    Smokeless tobacco: Never Used    Tobacco comment: ~40 pack years, stopped at age 39 disorder, unspecified type  Patient is self-medicating with 2 alcoholic drinks in the morning. Advised him to try and reduce to 1. Suggested he start Lexapro at 5 mg daily. For his nightmares at night he can resume prazosin. Let me know if he becomes dizzy. 5. HM - flu shot, rx shingrix    Return in about 2 months (around 11/25/2020). Age and gender appropriate preventive health care needs were discussed with patient and addressed as needed.

## 2020-09-25 ENCOUNTER — OFFICE VISIT (OUTPATIENT)
Dept: INTERNAL MEDICINE CLINIC | Age: 75
End: 2020-09-25
Payer: MEDICARE

## 2020-09-25 VITALS
WEIGHT: 194 LBS | OXYGEN SATURATION: 98 % | HEART RATE: 68 BPM | DIASTOLIC BLOOD PRESSURE: 62 MMHG | SYSTOLIC BLOOD PRESSURE: 102 MMHG | RESPIRATION RATE: 16 BRPM | TEMPERATURE: 96.9 F | BODY MASS INDEX: 27.06 KG/M2

## 2020-09-25 LAB — HBA1C MFR BLD: 6.9 %

## 2020-09-25 PROCEDURE — G8427 DOCREV CUR MEDS BY ELIG CLIN: HCPCS | Performed by: INTERNAL MEDICINE

## 2020-09-25 PROCEDURE — G8417 CALC BMI ABV UP PARAM F/U: HCPCS | Performed by: INTERNAL MEDICINE

## 2020-09-25 PROCEDURE — 3017F COLORECTAL CA SCREEN DOC REV: CPT | Performed by: INTERNAL MEDICINE

## 2020-09-25 PROCEDURE — 4040F PNEUMOC VAC/ADMIN/RCVD: CPT | Performed by: INTERNAL MEDICINE

## 2020-09-25 PROCEDURE — 2022F DILAT RTA XM EVC RTNOPTHY: CPT | Performed by: INTERNAL MEDICINE

## 2020-09-25 PROCEDURE — 1036F TOBACCO NON-USER: CPT | Performed by: INTERNAL MEDICINE

## 2020-09-25 PROCEDURE — 3044F HG A1C LEVEL LT 7.0%: CPT | Performed by: INTERNAL MEDICINE

## 2020-09-25 PROCEDURE — 99214 OFFICE O/P EST MOD 30 MIN: CPT | Performed by: INTERNAL MEDICINE

## 2020-09-25 PROCEDURE — G8510 SCR DEP NEG, NO PLAN REQD: HCPCS | Performed by: INTERNAL MEDICINE

## 2020-09-25 PROCEDURE — 1123F ACP DISCUSS/DSCN MKR DOCD: CPT | Performed by: INTERNAL MEDICINE

## 2020-09-25 PROCEDURE — 83036 HEMOGLOBIN GLYCOSYLATED A1C: CPT | Performed by: INTERNAL MEDICINE

## 2020-09-25 PROCEDURE — 3288F FALL RISK ASSESSMENT DOCD: CPT | Performed by: INTERNAL MEDICINE

## 2020-09-25 RX ORDER — PRAZOSIN HYDROCHLORIDE 1 MG/1
1 CAPSULE ORAL NIGHTLY
Qty: 30 CAPSULE | Refills: 3 | Status: SHIPPED | OUTPATIENT
Start: 2020-09-25 | End: 2021-02-01

## 2020-09-25 RX ORDER — ZOSTER VACCINE RECOMBINANT, ADJUVANTED 50 MCG/0.5
0.5 KIT INTRAMUSCULAR SEE ADMIN INSTRUCTIONS
Qty: 0.5 ML | Refills: 0 | Status: SHIPPED | OUTPATIENT
Start: 2020-09-25 | End: 2021-03-24

## 2020-09-25 RX ORDER — ESCITALOPRAM OXALATE 5 MG/1
5 TABLET ORAL DAILY
Qty: 30 TABLET | Refills: 3 | Status: SHIPPED | OUTPATIENT
Start: 2020-09-25 | End: 2021-01-18

## 2020-09-25 SDOH — HEALTH STABILITY: MENTAL HEALTH: HOW OFTEN DO YOU HAVE A DRINK CONTAINING ALCOHOL?: 4 OR MORE TIMES A WEEK

## 2020-09-25 SDOH — HEALTH STABILITY: MENTAL HEALTH: HOW MANY STANDARD DRINKS CONTAINING ALCOHOL DO YOU HAVE ON A TYPICAL DAY?: 1 OR 2

## 2020-09-25 ASSESSMENT — ENCOUNTER SYMPTOMS
DIARRHEA: 1
ABDOMINAL PAIN: 0
SHORTNESS OF BREATH: 0

## 2020-09-25 ASSESSMENT — PATIENT HEALTH QUESTIONNAIRE - PHQ9
2. FEELING DOWN, DEPRESSED OR HOPELESS: 0
SUM OF ALL RESPONSES TO PHQ QUESTIONS 1-9: 0
1. LITTLE INTEREST OR PLEASURE IN DOING THINGS: 0
SUM OF ALL RESPONSES TO PHQ9 QUESTIONS 1 & 2: 0
SUM OF ALL RESPONSES TO PHQ QUESTIONS 1-9: 0

## 2020-09-25 NOTE — PATIENT INSTRUCTIONS
Get diabetic retinal exam    A1c  Gi/colon    shingrix    Restart prazosin for nightmares    Start lexapro for anxiety

## 2020-10-08 RX ORDER — AMLODIPINE BESYLATE 5 MG/1
TABLET ORAL
Qty: 30 TABLET | Refills: 3 | Status: SHIPPED | OUTPATIENT
Start: 2020-10-08 | End: 2020-10-14

## 2020-10-08 RX ORDER — OLMESARTAN MEDOXOMIL 40 MG/1
TABLET ORAL
Qty: 30 TABLET | Refills: 1 | Status: SHIPPED | OUTPATIENT
Start: 2020-10-08 | End: 2020-12-21

## 2020-10-14 ENCOUNTER — OFFICE VISIT (OUTPATIENT)
Dept: CARDIOLOGY CLINIC | Age: 75
End: 2020-10-14
Payer: MEDICARE

## 2020-10-14 VITALS
BODY MASS INDEX: 27.02 KG/M2 | HEART RATE: 72 BPM | TEMPERATURE: 97.7 F | DIASTOLIC BLOOD PRESSURE: 78 MMHG | HEIGHT: 71 IN | WEIGHT: 193 LBS | SYSTOLIC BLOOD PRESSURE: 154 MMHG

## 2020-10-14 PROCEDURE — 99214 OFFICE O/P EST MOD 30 MIN: CPT | Performed by: NURSE PRACTITIONER

## 2020-10-14 PROCEDURE — G8482 FLU IMMUNIZE ORDER/ADMIN: HCPCS | Performed by: NURSE PRACTITIONER

## 2020-10-14 PROCEDURE — G8427 DOCREV CUR MEDS BY ELIG CLIN: HCPCS | Performed by: NURSE PRACTITIONER

## 2020-10-14 PROCEDURE — G8417 CALC BMI ABV UP PARAM F/U: HCPCS | Performed by: NURSE PRACTITIONER

## 2020-10-14 PROCEDURE — 3017F COLORECTAL CA SCREEN DOC REV: CPT | Performed by: NURSE PRACTITIONER

## 2020-10-14 PROCEDURE — 1123F ACP DISCUSS/DSCN MKR DOCD: CPT | Performed by: NURSE PRACTITIONER

## 2020-10-14 PROCEDURE — 1036F TOBACCO NON-USER: CPT | Performed by: NURSE PRACTITIONER

## 2020-10-14 PROCEDURE — 4040F PNEUMOC VAC/ADMIN/RCVD: CPT | Performed by: NURSE PRACTITIONER

## 2020-10-14 RX ORDER — HYDRALAZINE HYDROCHLORIDE 25 MG/1
25 TABLET, FILM COATED ORAL 2 TIMES DAILY
Qty: 60 TABLET | Refills: 3 | Status: SHIPPED | OUTPATIENT
Start: 2020-10-14 | End: 2021-06-24

## 2020-10-14 NOTE — PROGRESS NOTES
CC/HPI:    76 y.o. patient of Dr. Ilene Bernardle her for HTN, CAD, CABG, chronic dCHF, and HLD. BP are slightly improves. Still has elevated BP in the morning. (193/103,  130-150/70-80's during the day). He has dizziness at night. No chest pain, sob palpitations or syncope. No LE edema, orthopnea or PND. No fever, chills, n/v/d or GI/ bleeding. I started chlorthalidone last visit and he took it for 2 days and stopped because he said he didn't feel good on it. Recently started on prazosin and lexapro per PCP for anxiety and nightmares         Past Medical History:   Diagnosis Date    Anemia     mild stable    Anxiety disorder     Aortic valve sclerosis     Colitis 7/3/2011    Etiology?  Coronary artery disease involving native coronary artery 01/30/2016    sp cabg    Diabetes mellitus, type 2 ([address] 12/24/2014    Diabetic peripheral neuropathy ([address] 74/55/1367    Diastolic CHF ([address] 66/28/7753    Emphysema/COPD ([address] 12/24/2014    Esophageal reflux     Essential hypertension, benign     Hyperlipidemia     IBS (irritable bowel syndrome)     Impotence of organic origin     Insomnia 05/13/2010    nightmares    Neck pain 12/21/2015    Nephrolithiasis     ESTEFANÍA (obstructive sleep apnea)     PLMD (periodic limb movement disorder)     Psoriasis     Pulmonary nodules 11/05/2013    stable on fu    Thrombocytopenia, unspecified ([address]      Past Surgical History:   Procedure Laterality Date    CARDIAC CATHETERIZATION  1/29/2016    3 vessel disease    COLONOSCOPY  9/14/2005    Negative    COLONOSCOPY  8/13/2010    Polyp, diverticulosis    COLONOSCOPY  9/20/2013    Hemorrhoids, Diverticulosis.  CORONARY ANGIOPLASTY  1988    LAD    CORONARY ARTERY BYPASS GRAFT  2/1/2016    Dr. Malachy Krabbe - X5, LIMA to mid LAD, SVG sequentially to D1, OM1, OM2 and PLB (from circ); TCPB; endoscopic SV harvest R thigh; ICNB x 5 levels;  On-Q x 2    UPPER GASTROINTESTINAL ENDOSCOPY  9/14/2005    Negative    UPPER GASTROINTESTINAL ENDOSCOPY  2010    Gastritis, duodenitis. Family History   Problem Relation Age of Onset    Other Mother          (71) Anoxic encephalopathy, Esophageal strictures    Colon Cancer Father          (74) ASHD, Diabetes    Coronary Art Dis Brother         CABG    Coronary Art Dis Brother          (61) Gastric cancer     Social History     Tobacco Use    Smoking status: Former Smoker     Types: Cigarettes     Last attempt to quit: 1983     Years since quittin.8    Smokeless tobacco: Never Used    Tobacco comment: ~40 pack years, stopped at age 39   Substance Use Topics    Alcohol use: Yes     Frequency: 4 or more times a week     Drinks per session: 1 or 2     Comment: Two drinks of rum and juice in the am daily    Drug use: No     Allergies:Lisinopril and Chlorthalidone    Review of Systems  General: No changes in weight, fatigue, or night sweats. HEENT: No blurry or decreased vision. No changes in hearing, nasal discharge or sore throat. Cardiovascular:  See HPI. Respiratory: No cough, hemoptysis, or wheezing. No history of asthma. Gastrointestinal:  No abdominal pain, hematochezia, melana, constipation, diarrhea, or history of GI ulcers. Genito-Urinary: No dysuria or hematuria. No urgency or polyuria. Musculoskeletal:  No complaints of joint pain, joint swelling or muscular weakness/soreness. Neurological:  No numbness/tingling, speech problems or weakness. No history of a stroke or TIA. Psychological:  + Anxiety   Hematological and Lymphatic: No abnormal bleeding or bruising, blood clots, jaundice or swollen lymph nodes. Endocrine:   No malaise/lethargy, palpitations, polydipsia/polyuria, temperature intolerance or unexpected weight changes  Skin:  No rashes or non-healing ulcers.     Physical Exam:  BP (!) 154/78   Pulse 72   Temp 97.7 °F (36.5 °C)   Ht 5' 11\" (1.803 m)   Wt 193 lb (87.5 kg)   BMI 26.92 kg/m²    General (appearance):  No acute distress  Eyes: anicteric   Neck: soft, No JVD  Ears/Nose/Mouth/Thorat: No cyanosis  CV: RRR   Respiratory:  Clear  GI: soft, non-tender, non-distended  Skin: Warm, dry. No rashes  Neuro/Psych: Alert and oriented x 3. Appropriate behavior  Ext:  No c/c. No edema  Pulses:   2+ carotid.      Weight  Wt Readings from Last 3 Encounters:   10/14/20 193 lb (87.5 kg)   20 194 lb (88 kg)   09/15/20 195 lb (88.5 kg)          CBC:   Lab Results   Component Value Date    WBC 4.5 2020    HGB 12.2 (L) 2020    HCT 35.5 (L) 2020    MCV 95.4 2020    PLT 99 (L) 2020     BMP:  Lab Results   Component Value Date    CREATININE 1.0 2020    BUN 20 2020     2020    K 4.3 2020     2020    CO2 24 2020     Mag:   Lab Results   Component Value Date    MG 1.80 2020     LIVER PROFILE:   Lab Results   Component Value Date    ALT 24 2020    AST 24 2020    ALKPHOS 54 2020    BILITOT 0.6 2020     PT/INR:   Lab Results   Component Value Date    INR 1.09 2016    INR 1.13 2016    INR 0.98 2016    PROTIME 12.5 2016    PROTIME 12.9 2016    PROTIME 11.2 2016     Pro-BNP No results found for: PROBNP  LIPIDS:  No components found for: CHLPL  Lab Results   Component Value Date    TRIG 168 (H) 2020    TRIG 158 (H) 2020    TRIG 153 (H) 2018     Lab Results   Component Value Date    HDL 51 2020    HDL 46 2020    HDL 55 10/01/2019     Lab Results   Component Value Date    LDLCALC 69 2020    LDLCALC 62 2020    LDLCALC 66 10/01/2019     Lab Results   Component Value Date    LABVLDL 34 2020    LABVLDL 32 2020    LABVLDL 40 10/01/2019     TSH:  Lab Results   Component Value Date    TSH 3.04 2012       IMAGIN/8/2020 Renal artery duplex     Nasima Richa is no evidence to suggest renal artery stenosis bilaterally.     Incidental finding of left renal cyst 1.3 x 1.5cm.  Renal resistive indices are normal bilaterally. 12/2019 Nuc stress:     Mkmargaux Higginske is no evidence of stress-induced myocardial ischemia.     The LVEF is 70% with normal LV wall motion. 2018 Echo:   Summary   Concentric left ventricular hypertrophy. Normal left ventricular systolic function with an estimate ejection fraction   of 55-60%. There are no regional wall motion abnormalities. Diastolic filling parameters suggests grade I diastolic dysfunction . Trace aortic insufficiency. Mild mitral and tricuspid regurgitation. RVSP 26 mmHg. 2016 Coronary angiogram    1. Three-vessel coronary artery disease. 2.  Normal left ventricular systolic function. 3.  Successful Angio-Seal of right femoral arteriotomy. 4.  Patent left subclavian with patent left internal mammary artery. 5.  Qualitative enlargement of the aorta. CVTS consulted for CABG    2015 Nuc stress:     1. No evidence of ischemia or infarction. 2. Normal left ventricular ejection fraction greater than 65% with   normal left ventricular wall motion. Medications:   Current Outpatient Medications   Medication Sig Dispense Refill    olmesartan (BENICAR) 40 MG tablet TAKE 1 TABLET BY MOUTH DAILY 30 tablet 1    amLODIPine (NORVASC) 5 MG tablet TAKE 1 TABLET BY MOUTH EVERY DAY 30 tablet 3    zoster recombinant adjuvanted vaccine (SHINGRIX) 50 MCG/0.5ML SUSR injection Inject 0.5 mLs into the muscle See Admin Instructions 1 dose now and repeat in 2-6 months 0.5 mL 0    prazosin (MINIPRESS) 1 MG capsule Take 1 capsule by mouth nightly 30 capsule 3    escitalopram (LEXAPRO) 5 MG tablet Take 1 tablet by mouth daily 30 tablet 3    atorvastatin (LIPITOR) 40 MG tablet TAKE 1/2 TABLET BY MOUTH EVERY DAY 45 tablet 0    nitroGLYCERIN (NITROSTAT) 0.4 MG SL tablet Place 1 tablet under the tongue every 5 minutes as needed for Chest pain up to max of 3 total doses.  If no relief after 1 dose, call 911. 25 tablet 0    spironolactone (ALDACTONE) 25 MG tablet Take 0.5 tablets by mouth daily 30 tablet 3    metoprolol tartrate (LOPRESSOR) 25 MG tablet Take 1 tablet by mouth 2 times daily 30 tablet 5    chlorpheniramine maleate (CHLORPHEN SR) 12 MG TBCR extended release tablet Take 1 tablet by mouth every 12 hours as needed for Allergies 60 tablet 0    SITagliptin (JANUVIA) 100 MG tablet TAKE 1 TABLET BY MOUTH EVERY DAY 60 tablet 3    metFORMIN (GLUCOPHAGE-XR) 500 MG extended release tablet TAKE 1 TABLET BY MOUTH TWICE A DAY WITH FOOD 180 tablet 5    furosemide (LASIX) 20 MG tablet TAKE 1 TABLET BY MOUTH EVERY DAY 90 tablet 3    magnesium oxide (MAG-OX) 400 MG tablet Take 1 tablet by mouth daily 30 tablet 1    ketoconazole (NIZORAL) 2 % shampoo Apply topically daily as needed. 60 mL 1    Cholecalciferol (VITAMIN D) 2000 UNITS CAPS capsule Take by mouth      Omeprazole-Sodium Bicarbonate (ZEGERID OTC PO) 1 capsule by mouth at bedtime.  aspirin 81 MG tablet Take 81 mg by mouth daily      fluticasone (FLONASE) 50 MCG/ACT nasal spray INSTILL 2 SPRAYS INTO EACH NOSTRIL DAILY FOR SINUS CONGESTION 3 Bottle 3    halcinonide (HALOG) 0.1 % CREA Apply to rash twice a day as needed. 60 g 1    therapeutic multivitamin-minerals (THERAGRAN-M) tablet Take 1 tablet by mouth daily. No current facility-administered medications for this visit. Assessment:  1. Essential hypertension    2. CAD in native artery    3. S/P CABG x 5    4. Chronic heart failure with preserved ejection fraction (HCC)    5.  Mixed hyperlipidemia        Plan:  HTN   /78   Start hydralazine 25 mg po bid    Cont olmesartan and toprol    Prazosin   CAD/CABG   ASA   Metoprolol   olmesartan   Statin   Chronic dCHF/HFpEF   Appears compensated   Spironolactone   Lasix    ASA   Metoprolol   Olmesartan   HLD   Liptior     Follow up with me in 1 month   Follow up with Dr Edgardo Washington in February

## 2020-12-21 RX ORDER — OLMESARTAN MEDOXOMIL 40 MG/1
TABLET ORAL
Qty: 30 TABLET | Refills: 0 | Status: SHIPPED | OUTPATIENT
Start: 2020-12-21 | End: 2021-02-01

## 2021-01-07 ENCOUNTER — OFFICE VISIT (OUTPATIENT)
Dept: CARDIOLOGY CLINIC | Age: 76
End: 2021-01-07
Payer: MEDICARE

## 2021-01-07 VITALS
TEMPERATURE: 96.9 F | HEART RATE: 88 BPM | WEIGHT: 198 LBS | DIASTOLIC BLOOD PRESSURE: 100 MMHG | SYSTOLIC BLOOD PRESSURE: 154 MMHG | BODY MASS INDEX: 27.62 KG/M2

## 2021-01-07 DIAGNOSIS — I25.10 CORONARY ARTERY DISEASE INVOLVING NATIVE CORONARY ARTERY OF NATIVE HEART WITHOUT ANGINA PECTORIS: Primary | ICD-10-CM

## 2021-01-07 DIAGNOSIS — E78.5 DYSLIPIDEMIA: ICD-10-CM

## 2021-01-07 DIAGNOSIS — I10 ESSENTIAL HYPERTENSION: ICD-10-CM

## 2021-01-07 PROCEDURE — 99214 OFFICE O/P EST MOD 30 MIN: CPT | Performed by: INTERNAL MEDICINE

## 2021-01-07 PROCEDURE — G8428 CUR MEDS NOT DOCUMENT: HCPCS | Performed by: INTERNAL MEDICINE

## 2021-01-07 PROCEDURE — 4040F PNEUMOC VAC/ADMIN/RCVD: CPT | Performed by: INTERNAL MEDICINE

## 2021-01-07 PROCEDURE — 1123F ACP DISCUSS/DSCN MKR DOCD: CPT | Performed by: INTERNAL MEDICINE

## 2021-01-07 PROCEDURE — 3017F COLORECTAL CA SCREEN DOC REV: CPT | Performed by: INTERNAL MEDICINE

## 2021-01-07 PROCEDURE — G8482 FLU IMMUNIZE ORDER/ADMIN: HCPCS | Performed by: INTERNAL MEDICINE

## 2021-01-07 PROCEDURE — G8417 CALC BMI ABV UP PARAM F/U: HCPCS | Performed by: INTERNAL MEDICINE

## 2021-01-07 PROCEDURE — 1036F TOBACCO NON-USER: CPT | Performed by: INTERNAL MEDICINE

## 2021-01-07 RX ORDER — AMLODIPINE BESYLATE 5 MG/1
5 TABLET ORAL DAILY
Qty: 90 TABLET | Refills: 3 | Status: SHIPPED | OUTPATIENT
Start: 2021-01-07 | End: 2022-01-24

## 2021-01-07 NOTE — PROGRESS NOTES
Subjective:      Patient ID: Jamaal June is a 76 y.o. male. CC:  Follow up CAD    HPI Esa Layman is s/p CABG and is intending to play golf this year. No cp nor syncope. No NTG use.    bp high in am.       Allergies   Allergen Reactions    Lisinopril Other (See Comments)     cough    Chlorthalidone      diarrhea        Social History     Socioeconomic History    Marital status:      Spouse name: Brook Devries Number of children: 0    Years of education: 25    Highest education level: Not on file   Occupational History    Occupation: - Retired    Social Needs    Financial resource strain: Not on file    Food insecurity     Worry: Not on file     Inability: Not on file   [employer] needs     Medical: Not on file     Non-medical: Not on file   Tobacco Use    Smoking status: Former Smoker     Types: Cigarettes     Quit date: 1983     Years since quittin.0    Smokeless tobacco: Never Used    Tobacco comment: ~40 pack years, stopped at age 39   Substance and Sexual Activity    Alcohol use: Yes     Frequency: 4 or more times a week     Drinks per session: 1 or 2     Comment: Two drinks of rum and juice in the am daily    Drug use: No    Sexual activity: Yes     Partners: Female   Lifestyle    Physical activity     Days per week: Not on file     Minutes per session: Not on file    Stress: Not on file   Relationships    Social connections     Talks on phone: Not on file     Gets together: Not on file     Attends Buddhism service: Not on file     Active member of club or organization: Not on file     Attends meetings of clubs or organizations: Not on file     Relationship status: Not on file    Intimate partner violence     Fear of current or ex partner: Not on file     Emotionally abused: Not on file     Physically abused: Not on file     Forced sexual activity: Not on file   Other Topics Concern    Not on file   Social History Narrative    Has grown stepson    Lives part time in [facility] and part time in [address] [place] [address]        Patient has a family history includes Colon Cancer in his father; Coronary Art Dis in his brother and brother; Other in his mother. Patient  has a past medical history of Anemia, Anxiety disorder, Aortic valve sclerosis, Colitis, Coronary artery disease involving native coronary artery, Diabetes mellitus, type 2 ([address] Diabetic peripheral neuropathy ([address] Diastolic CHF ([address] Emphysema/COPD ([address] Esophageal reflux, Essential hypertension, benign, Hyperlipidemia, IBS (irritable bowel syndrome), Impotence of organic origin, Insomnia, Neck pain, Nephrolithiasis, ESTEFANÍA (obstructive sleep apnea), PLMD (periodic limb movement disorder), Psoriasis, Pulmonary nodules, and Thrombocytopenia, unspecified ([facility] Current Outpatient Medications   Medication Sig Dispense Refill    amLODIPine (NORVASC) 5 MG tablet Take 1 tablet by mouth daily Take daily at 7 pm 90 tablet 3    olmesartan (BENICAR) 40 MG tablet TAKE 1 TABLET BY MOUTH DAILY 30 tablet 0    hydrALAZINE (APRESOLINE) 25 MG tablet Take 1 tablet by mouth 2 times daily 60 tablet 3    zoster recombinant adjuvanted vaccine (SHINGRIX) 50 MCG/0.5ML SUSR injection Inject 0.5 mLs into the muscle See Admin Instructions 1 dose now and repeat in 2-6 months 0.5 mL 0    prazosin (MINIPRESS) 1 MG capsule Take 1 capsule by mouth nightly 30 capsule 3    escitalopram (LEXAPRO) 5 MG tablet Take 1 tablet by mouth daily 30 tablet 3    atorvastatin (LIPITOR) 40 MG tablet TAKE 1/2 TABLET BY MOUTH EVERY DAY 45 tablet 0    nitroGLYCERIN (NITROSTAT) 0.4 MG SL tablet Place 1 tablet under the tongue every 5 minutes as needed for Chest pain up to max of 3 total doses.  If no relief after 1 dose, call 911. 25 tablet 0    spironolactone (ALDACTONE) 25 MG tablet Take 0.5 tablets by mouth daily 30 tablet 3    metoprolol tartrate (LOPRESSOR) 25 MG tablet Take 1 tablet by mouth 2 times daily 30 tablet 5    chlorpheniramine maleate (CHLORPHEN SR) 12 MG TBCR extended release tablet Take 1 tablet by mouth every 12 hours as needed for Allergies 60 tablet 0    SITagliptin (JANUVIA) 100 MG tablet TAKE 1 TABLET BY MOUTH EVERY DAY 60 tablet 3    metFORMIN (GLUCOPHAGE-XR) 500 MG extended release tablet TAKE 1 TABLET BY MOUTH TWICE A DAY WITH FOOD 180 tablet 5    furosemide (LASIX) 20 MG tablet TAKE 1 TABLET BY MOUTH EVERY DAY 90 tablet 3    magnesium oxide (MAG-OX) 400 MG tablet Take 1 tablet by mouth daily 30 tablet 1    ketoconazole (NIZORAL) 2 % shampoo Apply topically daily as needed. 60 mL 1    Cholecalciferol (VITAMIN D) 2000 UNITS CAPS capsule Take by mouth      Omeprazole-Sodium Bicarbonate (ZEGERID OTC PO) 1 capsule by mouth at bedtime.  aspirin 81 MG tablet Take 81 mg by mouth daily      fluticasone (FLONASE) 50 MCG/ACT nasal spray INSTILL 2 SPRAYS INTO EACH NOSTRIL DAILY FOR SINUS CONGESTION 3 Bottle 3    halcinonide (HALOG) 0.1 % CREA Apply to rash twice a day as needed. 60 g 1    therapeutic multivitamin-minerals (THERAGRAN-M) tablet Take 1 tablet by mouth daily. No current facility-administered medications for this visit. Vitals  Weight: 198 lb (89.8 kg)  Blood Pressure: BP: (154-158)/(100)    Pulse: 88       Review of Systems   Constitutional: Negative. HENT: Negative. Respiratory: Negative. Cardiovascular: Negative. Gastrointestinal: Negative. Genitourinary: Negative. Musculoskeletal: Negative. Skin: Negative. Allergic/Immunologic: Negative. Neurological: Negative. Hematological: Negative. Psychiatric/Behavioral: Negative. Objective:   Physical Exam   Constitutional: He is oriented to person, place, and time. He appears well-developed and well-nourished. HENT:   Head: Normocephalic and atraumatic. Eyes: Conjunctivae and EOM are normal. Pupils are equal, round, and reactive to light. Neck: Normal range of motion. Neck supple.    Cardiovascular: Normal rate and regular rhythm. Exam reveals no friction rub. II/VI HS murmur heard. Pulmonary/Chest: Effort normal and breath sounds normal.   Abdominal: Soft. Bowel sounds are normal.   Musculoskeletal: Normal range of motion. He exhibits no edema or tenderness. Neurological: He is alert and oriented to person, place, and time. No cranial nerve deficit. Coordination normal.   Skin: Skin is warm. No rash noted. No erythema. No pallor. Psychiatric: He has a normal mood and affect. His behavior is normal. Judgment and thought content normal.       Assessment:      Diagnosis Orders   1. Coronary artery disease involving native coronary artery of native heart without angina pectoris     2. Dyslipidemia     3. Essential hypertension               Plan:      Continue current medications. Stable cardiovascular status after 5 v CABG on 2/1/16. Doing well. sr with old RBBB.  ecg unchanged. SOB:  Better with bp control  Palpitations:  Continue lopressor. HTN;  Sometimes low in am.  Restart  norvasc and continue minipress 1 mg daily.     HLP:  ldl 62 on 5/20

## 2021-01-18 RX ORDER — ESCITALOPRAM OXALATE 5 MG/1
TABLET ORAL
Qty: 30 TABLET | Refills: 0 | Status: SHIPPED | OUTPATIENT
Start: 2021-01-18 | End: 2021-02-15

## 2021-01-31 DIAGNOSIS — I10 ESSENTIAL HYPERTENSION, BENIGN: ICD-10-CM

## 2021-02-01 ENCOUNTER — TELEPHONE (OUTPATIENT)
Dept: INTERNAL MEDICINE CLINIC | Age: 76
End: 2021-02-01

## 2021-02-01 RX ORDER — PRAZOSIN HYDROCHLORIDE 1 MG/1
CAPSULE ORAL
Qty: 30 CAPSULE | Refills: 3 | Status: SHIPPED | OUTPATIENT
Start: 2021-02-01 | End: 2021-06-08

## 2021-02-01 RX ORDER — OLMESARTAN MEDOXOMIL 40 MG/1
TABLET ORAL
Qty: 30 TABLET | Refills: 2 | Status: SHIPPED | OUTPATIENT
Start: 2021-02-01 | End: 2021-05-10

## 2021-02-01 NOTE — TELEPHONE ENCOUNTER
Last appointment: 9/25/2020  Next appointment: 2/1/2021  Last refill: Benicar 12/21/2020, Prazosin 9/25/2020

## 2021-02-01 NOTE — TELEPHONE ENCOUNTER
Spoke with patient who was no show for appointment today. Called patient to reschedule and he advises he is leaving for [place] and will not be back until July. He was seen by Dr. Marcellus Yo last week who adjusted his blood pressure medication and it seems to be improving. He will be returning to [place] in July and appointment scheduled for when he returns.

## 2021-02-16 RX ORDER — ESCITALOPRAM OXALATE 5 MG/1
TABLET ORAL
Qty: 30 TABLET | Refills: 5 | Status: SHIPPED | OUTPATIENT
Start: 2021-02-16 | End: 2021-06-24

## 2021-02-23 RX ORDER — ATORVASTATIN CALCIUM 40 MG/1
TABLET, FILM COATED ORAL
Qty: 45 TABLET | Refills: 0 | Status: SHIPPED | OUTPATIENT
Start: 2021-02-23 | End: 2021-05-18

## 2021-03-02 NOTE — TELEPHONE ENCOUNTER
Last appointment: 2/1/2021  Next appointment: 7/2/2021  Last refill:05/11/2020 # 60 with three refills

## 2021-05-03 ENCOUNTER — TELEPHONE (OUTPATIENT)
Dept: INTERNAL MEDICINE CLINIC | Age: 76
End: 2021-05-03

## 2021-05-03 NOTE — TELEPHONE ENCOUNTER
Tell patient I received a notification about his emergency room visit to [facility] [id number]. Please ask him to follow-up if he is not improving.

## 2021-05-10 DIAGNOSIS — I10 ESSENTIAL HYPERTENSION, BENIGN: ICD-10-CM

## 2021-05-10 RX ORDER — OLMESARTAN MEDOXOMIL 40 MG/1
TABLET ORAL
Qty: 30 TABLET | Refills: 2 | Status: SHIPPED | OUTPATIENT
Start: 2021-05-10 | End: 2021-08-16

## 2021-05-18 RX ORDER — ATORVASTATIN CALCIUM 40 MG/1
TABLET, FILM COATED ORAL
Qty: 45 TABLET | Refills: 0 | Status: SHIPPED | OUTPATIENT
Start: 2021-05-18 | End: 2021-08-10

## 2021-05-21 NOTE — TELEPHONE ENCOUNTER
Requested Prescriptions     Pending Prescriptions Disp Refills    metoprolol tartrate (LOPRESSOR) 25 MG tablet [Pharmacy Med Name: METOPROLOL TARTRATE 25 MG TAB] 180 tablet 3     Sig: TAKE 1 TABLET BY MOUTH TWICE A DAY              Last Office Visit: 1/7/2021     Next Office Visit: 6/7/2021

## 2021-06-07 ENCOUNTER — OFFICE VISIT (OUTPATIENT)
Dept: CARDIOLOGY CLINIC | Age: 76
End: 2021-06-07
Payer: MEDICARE

## 2021-06-07 VITALS
HEART RATE: 67 BPM | DIASTOLIC BLOOD PRESSURE: 84 MMHG | BODY MASS INDEX: 26.78 KG/M2 | WEIGHT: 192 LBS | SYSTOLIC BLOOD PRESSURE: 132 MMHG

## 2021-06-07 DIAGNOSIS — I50.32 CHRONIC HEART FAILURE WITH PRESERVED EJECTION FRACTION (HCC): ICD-10-CM

## 2021-06-07 DIAGNOSIS — R09.89 CAROTID BRUIT, UNSPECIFIED LATERALITY: ICD-10-CM

## 2021-06-07 DIAGNOSIS — I10 ESSENTIAL HYPERTENSION: Primary | ICD-10-CM

## 2021-06-07 PROCEDURE — G8417 CALC BMI ABV UP PARAM F/U: HCPCS | Performed by: INTERNAL MEDICINE

## 2021-06-07 PROCEDURE — 99214 OFFICE O/P EST MOD 30 MIN: CPT | Performed by: INTERNAL MEDICINE

## 2021-06-07 PROCEDURE — G8427 DOCREV CUR MEDS BY ELIG CLIN: HCPCS | Performed by: INTERNAL MEDICINE

## 2021-06-07 PROCEDURE — 1123F ACP DISCUSS/DSCN MKR DOCD: CPT | Performed by: INTERNAL MEDICINE

## 2021-06-07 PROCEDURE — 3017F COLORECTAL CA SCREEN DOC REV: CPT | Performed by: INTERNAL MEDICINE

## 2021-06-07 PROCEDURE — 4040F PNEUMOC VAC/ADMIN/RCVD: CPT | Performed by: INTERNAL MEDICINE

## 2021-06-07 PROCEDURE — 1036F TOBACCO NON-USER: CPT | Performed by: INTERNAL MEDICINE

## 2021-06-07 NOTE — PROGRESS NOTES
Subjective:      Patient ID: Lynn Hassan is a 76 y.o. male. CC:  Follow up CAD    HPI Celine Puri is s/p CABG and is intending to play golf this year. No cp nor syncope. No NTG use. bp is controlled today. Has weakness of extremities. Allergies   Allergen Reactions    Lisinopril Other (See Comments)     cough    Chlorthalidone      diarrhea        Social History     Socioeconomic History    Marital status:      Spouse name: Mara Ceballos Number of children: 0    Years of education: 25    Highest education level: Not on file   Occupational History    Occupation: - Retired    Tobacco Use    Smoking status: Former Smoker     Types: Cigarettes     Quit date: 1983     Years since quittin.4    Smokeless tobacco: Never Used    Tobacco comment: ~40 pack years, stopped at age 39   Substance and Sexual Activity    Alcohol use: Yes     Comment: Two drinks of rum and juice in the am daily    Drug use: No    Sexual activity: Yes     Partners: Female   Other Topics Concern    Not on file   Social History Narrative    Has grown stepson    Lives part time in [facility] and part time in [address] [place] [address] Road Strain:     Difficulty of Paying Living Expenses:    Food Insecurity:     Worried About [address] in the Last Year:     [address] in the Last Year:    Transportation Needs:     Lack of Transportation (Medical):      Lack of Transportation (Non-Medical):    Physical Activity:     Days of Exercise per Week:     Minutes of Exercise per Session:    Stress:     Feeling of Stress :    Social Connections:     Frequency of Communication with Friends and Family:     Frequency of Social Gatherings with Friends and Family:     Attends [employer] Services:     Active Member of Clubs or Organizations:     Attends Club or Organization Meetings:     Marital Status:    Intimate Partner Violence:     Fear of Current or Ex-Partner:     Emotionally Abused:     Physically Abused:     Sexually Abused:         Patient has a family history includes Colon Cancer in his father; Coronary Art Dis in his brother and brother; Other in his mother. Patient  has a past medical history of Anemia, Anxiety disorder, Aortic valve sclerosis, Colitis, Coronary artery disease involving native coronary artery, Diabetes mellitus, type 2 ([address] Diabetic peripheral neuropathy ([address] Diastolic CHF ([address] Emphysema/COPD ([address] Esophageal reflux, Essential hypertension, benign, Hyperlipidemia, IBS (irritable bowel syndrome), Impotence of organic origin, Insomnia, Neck pain, Nephrolithiasis, ESTEFANÍA (obstructive sleep apnea), PLMD (periodic limb movement disorder), Psoriasis, Pulmonary nodules, and Thrombocytopenia, unspecified ([facility]      Current Outpatient Medications   Medication Sig Dispense Refill    metFORMIN (GLUCOPHAGE-XR) 500 MG extended release tablet TAKE 1 TABLET BY MOUTH TWICE A DAY WITH FOOD 60 tablet 1    metoprolol tartrate (LOPRESSOR) 25 MG tablet TAKE 1 TABLET BY MOUTH TWICE A  tablet 3    atorvastatin (LIPITOR) 40 MG tablet TAKE 1/2 TABLET BY MOUTH EVERY DAY 45 tablet 0    olmesartan (BENICAR) 40 MG tablet TAKE 1 TABLET BY MOUTH DAILY 30 tablet 2    furosemide (LASIX) 20 MG tablet TAKE 1 TABLET BY MOUTH EVERY DAY 90 tablet 0    spironolactone (ALDACTONE) 25 MG tablet TAKE 1/2 TABLET BY MOUTH EVERY DAY 45 tablet 0    SITagliptin (JANUVIA) 100 MG tablet TAKE 1 TABLET BY MOUTH EVERY DAY 60 tablet 5    escitalopram (LEXAPRO) 5 MG tablet TAKE 1 TABLET BY MOUTH EVERY DAY 30 tablet 5    prazosin (MINIPRESS) 1 MG capsule TAKE 1 CAPSULE BY MOUTH EVERY DAY AT NIGHT 30 capsule 3    amLODIPine (NORVASC) 5 MG tablet Take 1 tablet by mouth daily Take daily at 7 pm 90 tablet 3    hydrALAZINE (APRESOLINE) 25 MG tablet Take 1 tablet by mouth 2 times daily 60 tablet 3    nitroGLYCERIN (NITROSTAT) 0.4 MG SL tablet Place 1 tablet under the tongue every 5 minutes as needed for Chest pain up to max of 3 total doses. If no relief after 1 dose, call 911. 25 tablet 0    chlorpheniramine maleate (CHLORPHEN SR) 12 MG TBCR extended release tablet Take 1 tablet by mouth every 12 hours as needed for Allergies 60 tablet 0    magnesium oxide (MAG-OX) 400 MG tablet Take 1 tablet by mouth daily 30 tablet 1    ketoconazole (NIZORAL) 2 % shampoo Apply topically daily as needed. 60 mL 1    Cholecalciferol (VITAMIN D) 2000 UNITS CAPS capsule Take by mouth      Omeprazole-Sodium Bicarbonate (ZEGERID OTC PO) 1 capsule by mouth at bedtime.  aspirin 81 MG tablet Take 81 mg by mouth daily      fluticasone (FLONASE) 50 MCG/ACT nasal spray INSTILL 2 SPRAYS INTO EACH NOSTRIL DAILY FOR SINUS CONGESTION 3 Bottle 3    halcinonide (HALOG) 0.1 % CREA Apply to rash twice a day as needed. 60 g 1    therapeutic multivitamin-minerals (THERAGRAN-M) tablet Take 1 tablet by mouth daily. No current facility-administered medications for this visit. Vitals  Weight: 192 lb (87.1 kg)  Blood Pressure: BP: (132)/(84)    Pulse: 67       Review of Systems   Constitutional: Negative. HENT: Negative. Respiratory: Negative. Cardiovascular: Negative. Gastrointestinal: Negative. Genitourinary: Negative. Musculoskeletal: Negative. Skin: Negative. Allergic/Immunologic: Negative. Neurological: Negative. Hematological: Negative. Psychiatric/Behavioral: Negative. Objective:   Physical Exam   Constitutional: He is oriented to person, place, and time. He appears well-developed and well-nourished. HENT:   Head: Normocephalic and atraumatic. Eyes: Conjunctivae and EOM are normal. Pupils are equal, round, and reactive to light. Neck: Normal range of motion. Neck supple. Cardiovascular: Normal rate and regular rhythm. Exam reveals no friction rub. II/VI HS murmur heard.   Pulmonary/Chest: Effort normal and breath sounds normal. Abdominal: Soft. Bowel sounds are normal.   Musculoskeletal: Normal range of motion. He exhibits no edema or tenderness. Neurological: He is alert and oriented to person, place, and time. No cranial nerve deficit. Coordination normal.   Skin: Skin is warm. No rash noted. No erythema. No pallor. Psychiatric: He has a normal mood and affect. His behavior is normal. Judgment and thought content normal.       Assessment:     .   Diagnosis Orders   1. Essential hypertension  VL DUP CAROTID BILATERAL   2. Chronic heart failure with preserved ejection fraction (HCC)  VL DUP CAROTID BILATERAL   3. Carotid bruit, unspecified laterality  VL DUP CAROTID BILATERAL             Plan:      Continue current medications. Stable cardiovascular status after 5 v CABG on 2/1/16. Doing well. sr with old RBBB.  ecg unchanged. SOB:  Better with bp control  Palpitations:  Continue lopressor. HTN;  Sometimes low in am.  Restart  norvasc and continue minipress 1 mg daily.     HLP:  ldl 53 on 1/21  Weakness and bruit check carotid US

## 2021-06-08 RX ORDER — PRAZOSIN HYDROCHLORIDE 1 MG/1
CAPSULE ORAL
Qty: 30 CAPSULE | Refills: 5 | Status: SHIPPED | OUTPATIENT
Start: 2021-06-08 | End: 2021-12-02

## 2021-06-08 NOTE — TELEPHONE ENCOUNTER
Last appointment: 9/25/2020  Next appointment: 6/24/2021  Last refill: 02/01/221 # 30 with 3 refills

## 2021-06-10 ENCOUNTER — HOSPITAL ENCOUNTER (OUTPATIENT)
Dept: VASCULAR LAB | Age: 76
Discharge: HOME | End: 2021-06-10
Payer: MEDICARE

## 2021-06-10 PROCEDURE — 93880 EXTRACRANIAL BILAT STUDY: CPT

## 2021-06-24 ENCOUNTER — OFFICE VISIT (OUTPATIENT)
Dept: INTERNAL MEDICINE CLINIC | Age: 76
End: 2021-06-24
Payer: MEDICARE

## 2021-06-24 VITALS
DIASTOLIC BLOOD PRESSURE: 62 MMHG | HEIGHT: 72 IN | BODY MASS INDEX: 25.76 KG/M2 | WEIGHT: 190.2 LBS | HEART RATE: 53 BPM | OXYGEN SATURATION: 97 % | SYSTOLIC BLOOD PRESSURE: 128 MMHG

## 2021-06-24 DIAGNOSIS — R06.02 PERSISTENT SHORTNESS OF BREATH AFTER COVID-19: ICD-10-CM

## 2021-06-24 DIAGNOSIS — U09.9 PERSISTENT SHORTNESS OF BREATH AFTER COVID-19: ICD-10-CM

## 2021-06-24 DIAGNOSIS — D64.9 ANEMIA, UNSPECIFIED TYPE: ICD-10-CM

## 2021-06-24 DIAGNOSIS — D69.6 THROMBOCYTOPENIA (HCC): ICD-10-CM

## 2021-06-24 DIAGNOSIS — H91.90 HEARING LOSS, UNSPECIFIED HEARING LOSS TYPE, UNSPECIFIED LATERALITY: ICD-10-CM

## 2021-06-24 DIAGNOSIS — Z11.59 SCREENING FOR VIRAL DISEASE: ICD-10-CM

## 2021-06-24 DIAGNOSIS — I25.10 CORONARY ARTERY DISEASE INVOLVING NATIVE CORONARY ARTERY OF NATIVE HEART WITHOUT ANGINA PECTORIS: ICD-10-CM

## 2021-06-24 DIAGNOSIS — J43.2 CENTRILOBULAR EMPHYSEMA (HCC): ICD-10-CM

## 2021-06-24 DIAGNOSIS — Z00.00 ROUTINE GENERAL MEDICAL EXAMINATION AT A HEALTH CARE FACILITY: Primary | ICD-10-CM

## 2021-06-24 DIAGNOSIS — M25.512 LEFT SHOULDER PAIN, UNSPECIFIED CHRONICITY: ICD-10-CM

## 2021-06-24 DIAGNOSIS — E11.42 TYPE 2 DIABETES MELLITUS WITH PERIPHERAL NEUROPATHY (HCC): ICD-10-CM

## 2021-06-24 DIAGNOSIS — F41.9 ANXIETY DISORDER, UNSPECIFIED TYPE: ICD-10-CM

## 2021-06-24 PROCEDURE — G0439 PPPS, SUBSEQ VISIT: HCPCS | Performed by: INTERNAL MEDICINE

## 2021-06-24 PROCEDURE — 3017F COLORECTAL CA SCREEN DOC REV: CPT | Performed by: INTERNAL MEDICINE

## 2021-06-24 PROCEDURE — 1123F ACP DISCUSS/DSCN MKR DOCD: CPT | Performed by: INTERNAL MEDICINE

## 2021-06-24 PROCEDURE — 3023F SPIROM DOC REV: CPT | Performed by: INTERNAL MEDICINE

## 2021-06-24 PROCEDURE — G8926 SPIRO NO PERF OR DOC: HCPCS | Performed by: INTERNAL MEDICINE

## 2021-06-24 PROCEDURE — G8427 DOCREV CUR MEDS BY ELIG CLIN: HCPCS | Performed by: INTERNAL MEDICINE

## 2021-06-24 PROCEDURE — 3044F HG A1C LEVEL LT 7.0%: CPT | Performed by: INTERNAL MEDICINE

## 2021-06-24 PROCEDURE — 4040F PNEUMOC VAC/ADMIN/RCVD: CPT | Performed by: INTERNAL MEDICINE

## 2021-06-24 PROCEDURE — 99214 OFFICE O/P EST MOD 30 MIN: CPT | Performed by: INTERNAL MEDICINE

## 2021-06-24 PROCEDURE — G8417 CALC BMI ABV UP PARAM F/U: HCPCS | Performed by: INTERNAL MEDICINE

## 2021-06-24 PROCEDURE — 1036F TOBACCO NON-USER: CPT | Performed by: INTERNAL MEDICINE

## 2021-06-24 PROCEDURE — 2022F DILAT RTA XM EVC RTNOPTHY: CPT | Performed by: INTERNAL MEDICINE

## 2021-06-24 RX ORDER — SERTRALINE HYDROCHLORIDE 25 MG/1
25 TABLET, FILM COATED ORAL DAILY
Qty: 30 TABLET | Refills: 2 | Status: SHIPPED | OUTPATIENT
Start: 2021-06-24 | End: 2021-09-23

## 2021-06-24 SDOH — ECONOMIC STABILITY: FOOD INSECURITY: WITHIN THE PAST 12 MONTHS, THE FOOD YOU BOUGHT JUST DIDN'T LAST AND YOU DIDN'T HAVE MONEY TO GET MORE.: NEVER TRUE

## 2021-06-24 SDOH — ECONOMIC STABILITY: FOOD INSECURITY: WITHIN THE PAST 12 MONTHS, YOU WORRIED THAT YOUR FOOD WOULD RUN OUT BEFORE YOU GOT MONEY TO BUY MORE.: NEVER TRUE

## 2021-06-24 ASSESSMENT — PATIENT HEALTH QUESTIONNAIRE - PHQ9
SUM OF ALL RESPONSES TO PHQ QUESTIONS 1-9: 0
SUM OF ALL RESPONSES TO PHQ9 QUESTIONS 1 & 2: 0
2. FEELING DOWN, DEPRESSED OR HOPELESS: 0
SUM OF ALL RESPONSES TO PHQ QUESTIONS 1-9: 0
SUM OF ALL RESPONSES TO PHQ QUESTIONS 1-9: 0
1. LITTLE INTEREST OR PLEASURE IN DOING THINGS: 0

## 2021-06-24 ASSESSMENT — SOCIAL DETERMINANTS OF HEALTH (SDOH): HOW HARD IS IT FOR YOU TO PAY FOR THE VERY BASICS LIKE FOOD, HOUSING, MEDICAL CARE, AND HEATING?: NOT HARD AT ALL

## 2021-06-24 ASSESSMENT — LIFESTYLE VARIABLES: HOW OFTEN DO YOU HAVE A DRINK CONTAINING ALCOHOL: 0

## 2021-06-24 NOTE — PROGRESS NOTES
Medicare Annual Wellness Visit  Name: Tami Brewer Date: 2021   MRN: <[id number]> Sex: Male   Age: 76 y.o. Ethnicity: Non-/Non    : 1945 Race: Jeananne Phoenix is here for Medicare AWV    Screenings for behavioral, psychosocial and functional/safety risks, and cognitive dysfunction are all negative except as indicated below. These results, as well as other patient data from the [address] form, are documented in Flowsheets linked to this Encounter. Allergies   Allergen Reactions    Lisinopril Other (See Comments)     cough    Chlorthalidone      diarrhea         Prior to Visit Medications    Medication Sig Taking?  Authorizing Provider   aspirin 325 MG EC tablet Take 1 tablet by mouth daily Yes Alexia Ellison MD   sertraline (ZOLOFT) 25 MG tablet Take 1 tablet by mouth daily Yes Alexia Ellison MD   prazosin (MINIPRESS) 1 MG capsule TAKE 1 CAPSULE BY MOUTH EVERY DAY AT NIGHT Yes Alexia Ellison MD   metFORMIN (GLUCOPHAGE-XR) 500 MG extended release tablet TAKE 1 TABLET BY MOUTH TWICE A DAY WITH FOOD Yes Alexia Ellison MD   metoprolol tartrate (LOPRESSOR) 25 MG tablet TAKE 1 TABLET BY MOUTH TWICE A DAY Yes DIAN Walton CNP   atorvastatin (LIPITOR) 40 MG tablet TAKE 1/2 TABLET BY MOUTH EVERY DAY Yes Alexia Ellison MD   olmesartan (BENICAR) 40 MG tablet TAKE 1 TABLET BY MOUTH DAILY Yes Alexia Ellison MD   furosemide (LASIX) 20 MG tablet TAKE 1 TABLET BY MOUTH EVERY DAY Yes DIAN Garza CNP   spironolactone (ALDACTONE) 25 MG tablet TAKE 1/2 TABLET BY MOUTH EVERY DAY Yes DIAN Garza CNP   SITagliptin (JANUVIA) 100 MG tablet TAKE 1 TABLET BY MOUTH EVERY DAY Yes Alexia Ellison MD   amLODIPine (NORVASC) 5 MG tablet Take 1 tablet by mouth daily Take daily at 7 pm Yes Demetrio Peres MD   nitroGLYCERIN (NITROSTAT) 0.4 MG SL tablet Place 1 tablet under the tongue every 5 minutes as needed for Chest pain up to max of 3 total doses. If no relief after 1 dose, call 911. Yes DIAN Vallejo - APOORVA   chlorpheniramine maleate (CHLORPHEN SR) 12 MG TBCR extended release tablet Take 1 tablet by mouth every 12 hours as needed for Allergies Yes Lauryn Bailey MD   magnesium oxide (MAG-OX) 400 MG tablet Take 1 tablet by mouth daily Yes Lauryn Bailey MD   ketoconazole (NIZORAL) 2 % shampoo Apply topically daily as needed. Yes Lauryn Baliey MD   Cholecalciferol (VITAMIN D) 2000 UNITS CAPS capsule Take by mouth Yes Historical Provider, MD   Omeprazole-Sodium Bicarbonate (ZEGERID OTC PO) 1 capsule by mouth at bedtime. Yes Historical Provider, MD   fluticasone (FLONASE) 50 MCG/ACT nasal spray INSTILL 2 SPRAYS INTO EACH NOSTRIL DAILY FOR SINUS CONGESTION Yes Petty Williamson MD   halcinonide (HALOG) 0.1 % CREA Apply to rash twice a day as needed. Yes Petty Williamson MD   therapeutic multivitamin-minerals [facility]) tablet Take 1 tablet by mouth daily. Yes Petty Williamson MD         Past Medical History:   Diagnosis Date    Anemia     mild stable    Anxiety disorder     Aortic valve sclerosis     Colitis 07/03/2011    Etiology?     Colon polyps     Coronary artery disease involving native coronary artery 01/30/2016    sp cabg    COVID-19     x2-[place]    Diabetes mellitus, type 2 ([address] 12/24/2014    Diabetic peripheral neuropathy ([facility] 01/72/6843    Diastolic CHF ([address] 07/20/4901    Emphysema/COPD ([facility] [address] 12/24/2014    Esophageal reflux     Essential hypertension, benign     Hyperlipidemia     IBS (irritable bowel syndrome)     Impotence of organic origin     Insomnia 05/13/2010    nightmares    Neck pain 12/21/2015    Nephrolithiasis     ESTEFANÍA (obstructive sleep apnea)     PLMD (periodic limb movement disorder)     Psoriasis     Pulmonary nodules 11/05/2013    stable on fu    Thrombocytopenia, unspecified ([address] Past Surgical History:   Procedure Laterality Date    CARDIAC CATHETERIZATION  2016    3 vessel disease    COLONOSCOPY  2005    Negative    COLONOSCOPY  2010    Polyp, diverticulosis    COLONOSCOPY  2013    Hemorrhoids, Diverticulosis.  COLONOSCOPY  2021    many polyps, fu 1 yr    CORONARY ANGIOPLASTY  1988    LAD    CORONARY ARTERY BYPASS GRAFT  2016    Dr. Mia Hobbs - X5, LIMA to mid LAD, SVG sequentially to D1, OM1, OM2 and PLB (from circ); TCPB; endoscopic SV harvest R thigh; ICNB x 5 levels; On-Q x 2    UPPER GASTROINTESTINAL ENDOSCOPY  2005    Negative    UPPER GASTROINTESTINAL ENDOSCOPY  2010    Gastritis, duodenitis.  UPPER GASTROINTESTINAL ENDOSCOPY  2021    stricture/stretched         Family History   Problem Relation Age of Onset    Other Mother          (76) Anoxic encephalopathy, Esophageal strictures    Colon Cancer Father          (74) ASHD, Diabetes    Coronary Art Dis Brother         CABG    Coronary Art Dis Brother          (61) Gastric cancer       CareTeam (Including outside providers/suppliers regularly involved in providing care):   Patient Care Team:  Chelsie Crenshaw MD as PCP - General (Internal Medicine)  Chelsie Crenshaw MD as PCP - [facility]  [facility] DO as Consulting Physician (Hematology and Oncology)  [facility] Readings from Last 3 Encounters:   21 190 lb 3.2 oz (86.3 kg)   21 192 lb (87.1 kg)   21 198 lb (89.8 kg)     Vitals:    21 1058   BP: 128/62   Pulse: 53   SpO2: 97%   Weight: 190 lb 3.2 oz (86.3 kg)   Height: 6' (1.829 m)     Body mass index is 25.8 kg/m². Based upon direct observation of the patient, evaluation of cognition reveals memory intact. Patient is in no acute distress. Head is normocephalic atraumatic. Extraocular eye movements intact. Hearing appears reduced.   No carotid bruits noted.  Heart is regular rate and rhythm. Lungs are clear to auscultation. Abdomen is soft with positive bowel sounds. Lower legs are with trace edema. Patient is alert and oriented x3. He has no focal deficits. Sensory exam of the foot is mildly reduced, tested with the monofilament. Good pulses, no lesions or ulcers, good peripheral pulses. Left shoulder reduced range of motion. Patient's complete Health Risk Assessment and screening values have been reviewed and are found in Flowsheets. The following problems were reviewed today and where indicated follow up appointments were made and/or referrals ordered. Positive Risk Factor Screenings with Interventions:     Fall Risk:  2 or more falls in past year?: (!) yes  Fall with injury in past year?: (!) yes (fell getting off [employer] flight, hurting left side)  Fall Risk Interventions:  Discussed need to move more slowly and deliberately. These were mechanical falls, not preceded by dizziness. ·           General Health and ACP:  General  In general, how would you say your health is?: Fair  In the past 7 days, have you experienced any of the following? New or Increased Pain, New or Increased Fatigue, Loneliness, Social Isolation, Stress or Anger?: (!) Stress  Do you get the social and emotional support that you need?: Yes  Do you have a Living Will?: (!) No  Advance Directives     Power of  Living Will ACP-Advance Directive ACP-Power of     Not on File Not on File Not on File Not on File      General Health Risk Interventions: he declines living will, he will continue to take his Sertraline which helps.   ·     Health Habits/Nutrition:  Health Habits/Nutrition  Do you exercise for at least 20 minutes 2-3 times per week?: (!) No  Have you lost any weight without trying in the past 3 months?: No  Do you eat only one meal per day?: No  Have you seen the dentist within the past year?: (!) No  Body mass index: (!) 25.79  · Health Habits/Nutrition Interventions: He has dentures, he will try to exercise more.     Hearing/Vision:  No exam data present  Hearing/Vision  Do you or your family notice any trouble with your hearing that hasn't been managed with hearing aids?: (!) Yes  Do you have difficulty driving, watching TV, or doing any of your daily activities because of your eyesight?: No  Have you had an eye exam within the past year?: Yes  Hearing/Vision Interventions: will see audiology  ·     Safety:  Safety  Do you have working smoke detectors?: Yes  Have all throw rugs been removed or fastened?: (!) No  Do you have non-slip mats or surfaces in all bathtubs/showers?: (!) No  Do all of your stairways have a railing or banister?: Yes  Are your doorways, halls and stairs free of clutter?: Yes  Do you always fasten your seatbelt when you are in a car?: Yes  Safety Interventions: discussed home safety tips  ·      Personalized Preventive Plan   Current Health Maintenance Status  Immunization History   Administered Date(s) Administered    Influenza 09/14/2012, 11/05/2013    Influenza Nasal 10/12/2009    Influenza Vaccine, unspecified formulation 10/12/2009    Influenza Virus Vaccine 12/01/2011, 09/22/2014, 09/17/2015    Influenza, High Dose (Fluzone 65 yrs and older) 10/06/2016, 10/23/2017, 10/04/2018, 09/21/2020    Pneumococcal Conjugate 13-valent ([id number]) 06/30/2015    Pneumococcal Polysaccharide ([id number]) 08/03/2006, 08/29/2011    Td, unspecified formulation 06/01/2008    Tdap (Boostrix, Adacel) 07/09/2015, 08/12/2016        Health Maintenance   Topic Date Due    COVID-19 Vaccine (1) Never done    Shingles Vaccine (1 of 2) Never done   [employer] Visit (AWV)  Never done    Colon cancer screen colonoscopy  08/13/2020    Lipid screen  01/30/2022    Potassium monitoring  01/30/2022    Creatinine monitoring  01/30/2022    Diabetic foot exam  06/24/2022    A1C test (Diabetic or Prediabetic)  06/24/2022    Diabetic retinal exam 03/23/2023    DTaP/Tdap/Td vaccine (3 - Td or Tdap) 08/12/2026    Flu vaccine  Completed    Pneumococcal 65+ years Vaccine  Completed    AAA screen  Completed    Hepatitis C screen  Completed    Hepatitis A vaccine  Aged Out    Hib vaccine  Aged Out    Meningococcal (ACWY) vaccine  Aged Out     Recommendations for Combined Effort Due: see orders and patient instructions/AVS.  . Recommended screening schedule for the next 5-10 years is provided to the patient in written form: see Patient Instructions/AVS.    Nabil Schuster was seen today for medicare aw. Diagnoses and all orders for this visit:    Routine general medical examination at a health care facility    Type 2 diabetes mellitus with peripheral neuropathy ([facility] [address]  Stable on Metformin and Januvia. Sees Dr. Bindu Brito for podiatry. -     Diabetic Foot Exam  -     Hemoglobin A1C; Future    Centrilobular emphysema ([facility] [address]  Patient's continues to feel he does not need inhalers. Thrombocytopenia (HCC)  Mild stable    Anxiety disorder, unspecified type  Reasonably controlled on sertraline and prazosin. He states he has stopped drinking alcohol. He does not request a prescription for lorazepam.  I told him he could see Leonarda Nelson for further evaluation of whether this was indicated. -     sertraline (ZOLOFT) 25 MG tablet; Take 1 tablet by mouth daily    Anemia, unspecified type  -     Iron and TIBC; Future  -     VITAMIN B12 & FOLATE; Future    Left shoulder pain, unspecified chronicity  Since one of his recent falls  -     Mirna Lafleur MD, Orthopedic Surgery, [facility]    Persistent shortness of breath after COVID-19  -     XR CHEST STANDARD (2 VW); Future    Hearing loss, unspecified hearing loss type, unspecified laterality  -     [address] [facility]. MANUELA, Audiology, [place]    Coronary artery disease involving native coronary artery of native heart without angina pectoris  Stable on aspirin-told him to discuss dose with cardiology, statin, and beta-blocker. Also on Benicar, amlodipine, and spironolactone and Lasix for blood pressure control. Screening for viral disease  -     Varicella Zoster Antibody, IgG;  Future

## 2021-07-29 RX ORDER — METFORMIN HYDROCHLORIDE 500 MG/1
TABLET, EXTENDED RELEASE ORAL
Qty: 60 TABLET | Refills: 1 | Status: SHIPPED | OUTPATIENT
Start: 2021-07-29 | End: 2021-10-04

## 2021-08-02 NOTE — TELEPHONE ENCOUNTER
Requested Prescriptions     Pending Prescriptions Disp Refills    furosemide (LASIX) 20 MG tablet [Pharmacy Med Name: FUROSEMIDE 20 MG TABLET] 90 tablet 3     Sig: TAKE 1 TABLET BY MOUTH EVERY DAY                  Last Office Visit: 6/7/2021     Next Office Visit: 12/27/2021

## 2021-08-06 RX ORDER — FUROSEMIDE 20 MG/1
TABLET ORAL
Qty: 90 TABLET | Refills: 3 | Status: SHIPPED | OUTPATIENT
Start: 2021-08-06 | End: 2022-05-02

## 2021-08-10 RX ORDER — ATORVASTATIN CALCIUM 40 MG/1
TABLET, FILM COATED ORAL
Qty: 45 TABLET | Refills: 1 | Status: SHIPPED | OUTPATIENT
Start: 2021-08-10 | End: 2022-03-08

## 2021-08-14 DIAGNOSIS — I10 ESSENTIAL HYPERTENSION, BENIGN: ICD-10-CM

## 2021-08-16 RX ORDER — OLMESARTAN MEDOXOMIL 40 MG/1
TABLET ORAL
Qty: 30 TABLET | Refills: 2 | Status: SHIPPED | OUTPATIENT
Start: 2021-08-16 | End: 2021-11-22

## 2021-08-23 RX ORDER — SPIRONOLACTONE 25 MG/1
TABLET ORAL
Qty: 45 TABLET | Refills: 3 | Status: SHIPPED | OUTPATIENT
Start: 2021-08-23 | End: 2022-02-18 | Stop reason: SDUPTHER

## 2021-08-23 NOTE — TELEPHONE ENCOUNTER
Requested Prescriptions     Pending Prescriptions Disp Refills    spironolactone (ALDACTONE) 25 MG tablet [Pharmacy Med Name: SPIRONOLACTONE 25 MG TABLET] 45 tablet 3     Sig: TAKE 1/2 TABLET BY MOUTH EVERY DAY                  Last Office Visit: 6/7/2021     Next Office Visit: 12/27/2021

## 2021-09-22 ENCOUNTER — TELEPHONE (OUTPATIENT)
Dept: INTERNAL MEDICINE CLINIC | Age: 76
End: 2021-09-22

## 2021-09-22 NOTE — TELEPHONE ENCOUNTER
I contacted patient to confirm his appointment with Dr. Susan Nieves for tomorrow at [address]  Patient states he and his wife have been in [place] dealing with the death of her Brother. They are home now but will be leaving again in a day or two. He is not sure if he can make his appointment. He will call us back to let us know. He is wanting a call back regarding a chest Xray that was ordered for him awhile ago. He is asking if this can be done in [place]. Please contact patient to advise.

## 2021-09-22 NOTE — TELEPHONE ENCOUNTER
Please give him my condolences. Chest x-ray was ordered for persistent shortness of breath after Covid. Is he still having shortness of breath? If so I will put the order back in as it was discontinued due to length of time since ordered. I do not have her privileges in [place] so I do not think my orders would be valid there. Let us know if he wants to cancel and reschedule his appointment for tomorrow. Let me know if he wants to get the chest x-ray before going to [place].

## 2021-09-23 ENCOUNTER — OFFICE VISIT (OUTPATIENT)
Dept: INTERNAL MEDICINE CLINIC | Age: 76
End: 2021-09-23
Payer: MEDICARE

## 2021-09-23 VITALS
SYSTOLIC BLOOD PRESSURE: 142 MMHG | WEIGHT: 183 LBS | HEIGHT: 71 IN | TEMPERATURE: 98.4 F | HEART RATE: 54 BPM | DIASTOLIC BLOOD PRESSURE: 76 MMHG | BODY MASS INDEX: 25.62 KG/M2 | RESPIRATION RATE: 16 BRPM

## 2021-09-23 DIAGNOSIS — E11.42 TYPE 2 DIABETES MELLITUS WITH PERIPHERAL NEUROPATHY (HCC): Primary | ICD-10-CM

## 2021-09-23 DIAGNOSIS — Z91.81 AT HIGH RISK FOR FALLS: ICD-10-CM

## 2021-09-23 DIAGNOSIS — I25.10 CORONARY ARTERY DISEASE INVOLVING NATIVE CORONARY ARTERY OF NATIVE HEART WITHOUT ANGINA PECTORIS: ICD-10-CM

## 2021-09-23 DIAGNOSIS — D64.9 ANEMIA, UNSPECIFIED TYPE: ICD-10-CM

## 2021-09-23 PROCEDURE — 1036F TOBACCO NON-USER: CPT | Performed by: INTERNAL MEDICINE

## 2021-09-23 PROCEDURE — G8427 DOCREV CUR MEDS BY ELIG CLIN: HCPCS | Performed by: INTERNAL MEDICINE

## 2021-09-23 PROCEDURE — 4040F PNEUMOC VAC/ADMIN/RCVD: CPT | Performed by: INTERNAL MEDICINE

## 2021-09-23 PROCEDURE — 99214 OFFICE O/P EST MOD 30 MIN: CPT | Performed by: INTERNAL MEDICINE

## 2021-09-23 PROCEDURE — G8417 CALC BMI ABV UP PARAM F/U: HCPCS | Performed by: INTERNAL MEDICINE

## 2021-09-23 PROCEDURE — 1123F ACP DISCUSS/DSCN MKR DOCD: CPT | Performed by: INTERNAL MEDICINE

## 2021-09-23 NOTE — PROGRESS NOTES
Ochoa Chin (:  1945) is a 68 y.o. male, here for evaluation of the following chief complaint(s):    Follow-up      ASSESSMENT/PLAN:  1. Type 2 diabetes mellitus with peripheral neuropathy (HCC)  Stable on Metformin and Januvia. Sees Dr. Aamir Lima for podiatry. -     Comprehensive Metabolic Panel; Future  -     Hemoglobin A1C; Future  -     Lipid Panel; Future  2. Anemia, unspecified type  -     PROTEIN ELECTROPHORESIS, SERUM; Future  3. At high risk for falls  4. Coronary artery disease involving native coronary artery of native heart without angina pectoris  Stable on aspirin 325 mg (ok w cards)-statin, and beta-blocker. Also on Benicar, amlodipine, and spironolactone and Lasix for blood pressure control which is a little elevated today related to family stress. ---  Anxiety disorder, unspecified type  Reasonably controlled on prazosin. Return in about 4 months (around 2022). SUBJECTIVE/OBJECTIVE:  HPI   Patient is here for routine visit. He states that shortness of breath is improved. It had persisted after Covid but now back to his baseline. He denies chest pain. He states his diabetes is reasonably well controlled. He did not like the Zoloft and so he stopped it. Under some stress with the death of his brother in law. He thinks this is why his blood pressure is up today. Past Medical History:   Diagnosis Date    Anemia     mild stable    Anxiety disorder     Aortic valve sclerosis     Colitis 2011    Etiology?     Colon polyps     Coronary artery disease involving native coronary artery 2016    sp cabg    COVID-19     x2-[place]    Diabetes mellitus, type 2 ([address] 2014    Diabetic peripheral neuropathy ([facility]     Diastolic CHF ([address]     Emphysema/COPD ([facility] 2014    Esophageal reflux     Essential hypertension, benign     Hyperlipidemia     IBS (irritable bowel syndrome)     Impotence of organic origin     Insomnia 05/13/2010    nightmares    Neck pain 12/21/2015    Nephrolithiasis     ESTEFANÍA (obstructive sleep apnea)     PLMD (periodic limb movement disorder)     Presbyopia     right eye?  Psoriasis     Pulmonary nodules 11/05/2013    stable on fu    Thrombocytopenia, unspecified (HCC)        Current Outpatient Medications   Medication Sig Dispense Refill    spironolactone (ALDACTONE) 25 MG tablet TAKE 1/2 TABLET BY MOUTH EVERY DAY 45 tablet 3    olmesartan (BENICAR) 40 MG tablet TAKE 1 TABLET BY MOUTH DAILY 30 tablet 2    atorvastatin (LIPITOR) 40 MG tablet TAKE 1/2 TABLET BY MOUTH EVERY DAY 45 tablet 1    furosemide (LASIX) 20 MG tablet TAKE 1 TABLET BY MOUTH EVERY DAY 90 tablet 3    metFORMIN (GLUCOPHAGE-XR) 500 MG extended release tablet TAKE 1 TABLET BY MOUTH TWICE A DAY WITH FOOD 60 tablet 1    aspirin 325 MG EC tablet Take 1 tablet by mouth daily 30 tablet 3    prazosin (MINIPRESS) 1 MG capsule TAKE 1 CAPSULE BY MOUTH EVERY DAY AT NIGHT 30 capsule 5    metoprolol tartrate (LOPRESSOR) 25 MG tablet TAKE 1 TABLET BY MOUTH TWICE A  tablet 3    SITagliptin (JANUVIA) 100 MG tablet TAKE 1 TABLET BY MOUTH EVERY DAY 60 tablet 5    amLODIPine (NORVASC) 5 MG tablet Take 1 tablet by mouth daily Take daily at 7 pm 90 tablet 3    nitroGLYCERIN (NITROSTAT) 0.4 MG SL tablet Place 1 tablet under the tongue every 5 minutes as needed for Chest pain up to max of 3 total doses. If no relief after 1 dose, call 911. 25 tablet 0    chlorpheniramine maleate (CHLORPHEN SR) 12 MG TBCR extended release tablet Take 1 tablet by mouth every 12 hours as needed for Allergies 60 tablet 0    magnesium oxide (MAG-OX) 400 MG tablet Take 1 tablet by mouth daily 30 tablet 1    Cholecalciferol (VITAMIN D) 2000 UNITS CAPS capsule Take by mouth      Omeprazole-Sodium Bicarbonate (ZEGERID OTC PO) 1 capsule by mouth at bedtime.       fluticasone (FLONASE) 50 MCG/ACT nasal spray INSTILL 2 SPRAYS INTO EACH NOSTRIL DAILY FOR SINUS CONGESTION 3 Bottle 3    therapeutic multivitamin-minerals (THERAGRAN-M) tablet Take 1 tablet by mouth daily.  ketoconazole (NIZORAL) 2 % shampoo Apply topically daily as needed. 60 mL 1    halcinonide (HALOG) 0.1 % CREA Apply to rash twice a day as needed. 60 g 1     No current facility-administered medications for this visit. Physical Exam  Vitals and nursing note reviewed. Constitutional:       General: He is not in acute distress. Appearance: He is well-developed. HENT:      Head: Normocephalic and atraumatic. Right Ear: External ear normal.      Left Ear: External ear normal.   Eyes:      General: No scleral icterus. Extraocular Movements: Extraocular movements intact. Neck:      Thyroid: No thyromegaly. Cardiovascular:      Rate and Rhythm: Normal rate and regular rhythm. Heart sounds: Murmur heard. Pulmonary:      Effort: No respiratory distress. Breath sounds: Normal breath sounds. No wheezing or rales. Abdominal:      General: Bowel sounds are normal. There is no distension. Palpations: Abdomen is soft. Tenderness: There is no abdominal tenderness. Musculoskeletal:         General: No deformity. Normal range of motion. Cervical back: Normal range of motion. Lymphadenopathy:      Cervical: No cervical adenopathy. Skin:     General: Skin is warm and dry. Neurological:      Mental Status: He is alert and oriented to person, place, and time. Cranial Nerves: No cranial nerve deficit. Sensory: No sensory deficit. Coordination: Coordination normal.   Psychiatric:         Thought Content: Thought content normal.               This note was generated completely or in part utilizing Dragon dictation speech recognition software. Occasionally, words are mistranscribed and despite editing, the text may contain inaccuracies due to incorrect word recognition.   If further clarification is needed please contact the office at [phone]          An electronic signature was used to authenticate this note. --Bridgette Serna MD   On the basis of positive falls risk screening, assessment and plan is as follows: not indicated. Mechanical fall. Muriel Rico on airplane with tripping over a bag. Does not usually fall.

## 2021-09-23 NOTE — PATIENT INSTRUCTIONS
Advise flu shot and covid vaccine    Labs    Call if blood pressure not improving once stress level down and may need to adjust meds.

## 2021-10-01 ENCOUNTER — TELEPHONE (OUTPATIENT)
Dept: CARDIOLOGY CLINIC | Age: 76
End: 2021-10-01

## 2021-10-01 DIAGNOSIS — D47.2 MGUS (MONOCLONAL GAMMOPATHY OF UNKNOWN SIGNIFICANCE): Primary | ICD-10-CM

## 2021-10-01 NOTE — TELEPHONE ENCOUNTER
I Medication Refills:       metoprolol tartrate (LOPRESSOR) 25 MG tablet  TAKE 1 TABLET BY MOUTH TWICE A DAY, Disp-180 tablet, R-3    [facility]/pharmacy #[id number]- [facility] - [address] [place] [phone] Jennifered Stephon [phone]     Last Office Visit: 06/07/21    Next Office Visit: 12/27/21    Last Refill: 08/23/21    Last Labs: 09/23/21

## 2021-10-27 ENCOUNTER — OFFICE VISIT (OUTPATIENT)
Dept: CARDIOLOGY CLINIC | Age: 76
End: 2021-10-27
Payer: MEDICARE

## 2021-10-27 ENCOUNTER — TELEPHONE (OUTPATIENT)
Dept: CARDIOLOGY CLINIC | Age: 76
End: 2021-10-27

## 2021-10-27 VITALS
BODY MASS INDEX: 25.76 KG/M2 | OXYGEN SATURATION: 96 % | DIASTOLIC BLOOD PRESSURE: 84 MMHG | WEIGHT: 184 LBS | HEART RATE: 64 BPM | HEIGHT: 71 IN | SYSTOLIC BLOOD PRESSURE: 138 MMHG

## 2021-10-27 DIAGNOSIS — I25.10 CAD IN NATIVE ARTERY: ICD-10-CM

## 2021-10-27 DIAGNOSIS — E78.2 MIXED HYPERLIPIDEMIA: ICD-10-CM

## 2021-10-27 DIAGNOSIS — I10 ESSENTIAL HYPERTENSION: ICD-10-CM

## 2021-10-27 DIAGNOSIS — I50.32 CHRONIC HEART FAILURE WITH PRESERVED EJECTION FRACTION (HCC): Primary | ICD-10-CM

## 2021-10-27 DIAGNOSIS — Z95.1 S/P CABG X 5: ICD-10-CM

## 2021-10-27 PROCEDURE — 4040F PNEUMOC VAC/ADMIN/RCVD: CPT | Performed by: NURSE PRACTITIONER

## 2021-10-27 PROCEDURE — G8484 FLU IMMUNIZE NO ADMIN: HCPCS | Performed by: NURSE PRACTITIONER

## 2021-10-27 PROCEDURE — G8427 DOCREV CUR MEDS BY ELIG CLIN: HCPCS | Performed by: NURSE PRACTITIONER

## 2021-10-27 PROCEDURE — 1123F ACP DISCUSS/DSCN MKR DOCD: CPT | Performed by: NURSE PRACTITIONER

## 2021-10-27 PROCEDURE — 1036F TOBACCO NON-USER: CPT | Performed by: NURSE PRACTITIONER

## 2021-10-27 PROCEDURE — G8417 CALC BMI ABV UP PARAM F/U: HCPCS | Performed by: NURSE PRACTITIONER

## 2021-10-27 PROCEDURE — 99214 OFFICE O/P EST MOD 30 MIN: CPT | Performed by: NURSE PRACTITIONER

## 2021-10-27 RX ORDER — HYDRALAZINE HYDROCHLORIDE 25 MG/1
25 TABLET, FILM COATED ORAL 3 TIMES DAILY PRN
Qty: 90 TABLET | Refills: 3 | Status: SHIPPED | OUTPATIENT
Start: 2021-10-27 | End: 2022-02-18 | Stop reason: SDUPTHER

## 2021-10-27 NOTE — PROGRESS NOTES
64   Ht 5' 11\" (1.803 m)   Wt 184 lb (83.5 kg)   SpO2 96%   BMI 25.66 kg/m²    General (appearance):  No acute distress  Eyes: anicteric   Neck: soft, No JVD  Ears/Nose/Mouth/Thorat: No cyanosis  CV: RRR   Respiratory:  Clear  GI: soft, non-tender, non-distended  Skin: Warm, dry. No rashes  Neuro/Psych: Alert and oriented x 3. Appropriate behavior  Ext:  No c/c. No edema  Pulses:   2+ carotid.      Weight  Wt Readings from Last 3 Encounters:   21 183 lb (83 kg)   21 190 lb 3.2 oz (86.3 kg)   21 192 lb (87.1 kg)          CBC:   Lab Results   Component Value Date    WBC 5.2 2021    HGB 11.2 (L) 2021    HCT 34.0 (L) 2021    MCV 96.3 2021     (L) 2021     BMP:  Lab Results   Component Value Date    CREATININE 1.1 2021    BUN 20 2021     2021    K 4.7 2021     2021    CO2 21 2021     Mag:   Lab Results   Component Value Date    MG 1.80 2020     LIVER PROFILE:   Lab Results   Component Value Date    ALT 20 2021    AST 23 2021    ALKPHOS 68 2021    BILITOT 0.7 2021     PT/INR:   Lab Results   Component Value Date    INR 1.09 2016    INR 1.13 2016    INR 0.98 2016    PROTIME 12.5 2016    PROTIME 12.9 2016    PROTIME 11.2 2016     Pro-BNP No results found for: PROBNP  LIPIDS:  No components found for: CHLPL  Lab Results   Component Value Date    TRIG 158 (H) 2021    TRIG 148 2021    TRIG 168 (H) 2020     Lab Results   Component Value Date    HDL 50 2021    HDL 53 2021    HDL 51 2020     Lab Results   Component Value Date    LDLCALC 69 2021    LDLCALC 71 2021    LDLCALC 69 2020     Lab Results   Component Value Date    LABVLDL 32 2021    LABVLDL 30 2021    LABVLDL 34 2020     TSH:  Lab Results   Component Value Date    TSH 3.04 2012       IMAGIN/10/2021 Carotid duplex  Bilateral internal carotid arteries <50% stenosis    Bilateral vertebral arteries demonstrate normal antegrade flow. 9/8/2020 Renal artery duplex     Tod Carrero is no evidence to suggest renal artery stenosis bilaterally.     Incidental finding of left renal cyst 1.3 x 1.5cm.  Renal resistive indices are normal bilaterally. 12/2019 Nuc stress:     Tod Carrero is no evidence of stress-induced myocardial ischemia.     The LVEF is 70% with normal LV wall motion. 2018 Echo:   Summary   Concentric left ventricular hypertrophy. Normal left ventricular systolic function with an estimate ejection fraction   of 55-60%. There are no regional wall motion abnormalities. Diastolic filling parameters suggests grade I diastolic dysfunction . Trace aortic insufficiency. Mild mitral and tricuspid regurgitation. RVSP 26 mmHg. 2016 Coronary angiogram    1. Three-vessel coronary artery disease. 2.  Normal left ventricular systolic function. 3.  Successful Angio-Seal of right femoral arteriotomy. 4.  Patent left subclavian with patent left internal mammary artery. 5.  Qualitative enlargement of the aorta. CVTS consulted for CABG    2015 Nuc stress:     1. No evidence of ischemia or infarction. 2. Normal left ventricular ejection fraction greater than 65% with   normal left ventricular wall motion.          Medications:   Current Outpatient Medications   Medication Sig Dispense Refill    metFORMIN (GLUCOPHAGE-XR) 500 MG extended release tablet TAKE 1 TABLET BY MOUTH TWICE A DAY WITH FOOD 60 tablet 1    metoprolol tartrate (LOPRESSOR) 25 MG tablet TAKE 1 TABLET BY MOUTH TWICE A  tablet 2    spironolactone (ALDACTONE) 25 MG tablet TAKE 1/2 TABLET BY MOUTH EVERY DAY 45 tablet 3    olmesartan (BENICAR) 40 MG tablet TAKE 1 TABLET BY MOUTH DAILY 30 tablet 2    atorvastatin (LIPITOR) 40 MG tablet TAKE 1/2 TABLET BY MOUTH EVERY DAY 45 tablet 1    furosemide (LASIX) 20 MG tablet TAKE 1 TABLET BY MOUTH EVERY DAY 90 tablet 3    aspirin 325 MG EC tablet Take 1 tablet by mouth daily 30 tablet 3    prazosin (MINIPRESS) 1 MG capsule TAKE 1 CAPSULE BY MOUTH EVERY DAY AT NIGHT 30 capsule 5    SITagliptin (JANUVIA) 100 MG tablet TAKE 1 TABLET BY MOUTH EVERY DAY 60 tablet 5    amLODIPine (NORVASC) 5 MG tablet Take 1 tablet by mouth daily Take daily at 7 pm 90 tablet 3    nitroGLYCERIN (NITROSTAT) 0.4 MG SL tablet Place 1 tablet under the tongue every 5 minutes as needed for Chest pain up to max of 3 total doses. If no relief after 1 dose, call 911. 25 tablet 0    chlorpheniramine maleate (CHLORPHEN SR) 12 MG TBCR extended release tablet Take 1 tablet by mouth every 12 hours as needed for Allergies 60 tablet 0    magnesium oxide (MAG-OX) 400 MG tablet Take 1 tablet by mouth daily 30 tablet 1    ketoconazole (NIZORAL) 2 % shampoo Apply topically daily as needed. 60 mL 1    Cholecalciferol (VITAMIN D) 2000 UNITS CAPS capsule Take by mouth      Omeprazole-Sodium Bicarbonate (ZEGERID OTC PO) 1 capsule by mouth at bedtime.  fluticasone (FLONASE) 50 MCG/ACT nasal spray INSTILL 2 SPRAYS INTO EACH NOSTRIL DAILY FOR SINUS CONGESTION 3 Bottle 3    halcinonide (HALOG) 0.1 % CREA Apply to rash twice a day as needed. 60 g 1    therapeutic multivitamin-minerals (THERAGRAN-M) tablet Take 1 tablet by mouth daily. No current facility-administered medications for this visit. Assessment:  1. Chronic heart failure with preserved ejection fraction ([address]    2. CAD in native artery    3. S/P CABG x 5    4. Essential hypertension    5.  Mixed hyperlipidemia        Plan:  HTN (uncontrolled)   /84 in office   Start hydralazine 25 mg po TID PRN for SBP  > 160 or DBP > 90   olmesartan 40 mg and    metoprolol 25 mg po bid    Norvasc 5 mg    Prozosin 1 mg     CAD/CABG; stable    mg    Metoprolol 25 mg po bid    olmesartan 40 mg    Lipitor 40 mg   Chronic dCHF/HFpEF; stable   Appears compensated   Spironolactone 25 mg    Lasix 20 mg     mg    Metoprolol 25 mg po bid    Olmesartan 40 mg   HLD; stable    Liptior 40 mg      Will discuss with Dr Brianna Harper if he is willing to prescribe lorazepam.   Follow up with Dr Brianna Harper in December as scheduled.

## 2021-11-29 ENCOUNTER — OFFICE VISIT (OUTPATIENT)
Dept: CARDIOLOGY CLINIC | Age: 76
End: 2021-11-29
Payer: MEDICARE

## 2021-11-29 ENCOUNTER — TELEPHONE (OUTPATIENT)
Dept: INTERNAL MEDICINE CLINIC | Age: 76
End: 2021-11-29

## 2021-11-29 VITALS
BODY MASS INDEX: 26 KG/M2 | SYSTOLIC BLOOD PRESSURE: 152 MMHG | WEIGHT: 186.4 LBS | HEART RATE: 85 BPM | DIASTOLIC BLOOD PRESSURE: 86 MMHG

## 2021-11-29 DIAGNOSIS — E78.5 HYPERLIPIDEMIA, UNSPECIFIED HYPERLIPIDEMIA TYPE: Primary | ICD-10-CM

## 2021-11-29 DIAGNOSIS — I25.10 CORONARY ARTERY DISEASE INVOLVING NATIVE CORONARY ARTERY OF NATIVE HEART WITHOUT ANGINA PECTORIS: ICD-10-CM

## 2021-11-29 DIAGNOSIS — I10 HTN (HYPERTENSION), BENIGN: ICD-10-CM

## 2021-11-29 PROCEDURE — 1036F TOBACCO NON-USER: CPT | Performed by: INTERNAL MEDICINE

## 2021-11-29 PROCEDURE — G8427 DOCREV CUR MEDS BY ELIG CLIN: HCPCS | Performed by: INTERNAL MEDICINE

## 2021-11-29 PROCEDURE — 99214 OFFICE O/P EST MOD 30 MIN: CPT | Performed by: INTERNAL MEDICINE

## 2021-11-29 PROCEDURE — 1123F ACP DISCUSS/DSCN MKR DOCD: CPT | Performed by: INTERNAL MEDICINE

## 2021-11-29 PROCEDURE — G8417 CALC BMI ABV UP PARAM F/U: HCPCS | Performed by: INTERNAL MEDICINE

## 2021-11-29 PROCEDURE — 4040F PNEUMOC VAC/ADMIN/RCVD: CPT | Performed by: INTERNAL MEDICINE

## 2021-11-29 PROCEDURE — G8484 FLU IMMUNIZE NO ADMIN: HCPCS | Performed by: INTERNAL MEDICINE

## 2021-11-29 NOTE — PROGRESS NOTES
Subjective:      Patient ID: Jacqueline Watson is a 68 y.o. male. CC:  Follow up CAD    HPI Yi Little is s/p CABG and is intending to play golf this year. No cp nor syncope. No NTG use. bp is controlled today. Has weakness of extremities. Allergies   Allergen Reactions    Lisinopril Other (See Comments)     cough    Chlorthalidone      diarrhea    Sertraline      Felt unwell        Social History     Socioeconomic History    Marital status:      Spouse name: Kirstin Moses Number of children: 0    Years of education: 25    Highest education level: Not on file   Occupational History    Occupation: - Retired    Tobacco Use    Smoking status: Former Smoker     Types: Cigarettes     Quit date: 1983     Years since quittin.9    Smokeless tobacco: Never Used    Tobacco comment: ~40 pack years, stopped at age 39   Substance and Sexual Activity    Alcohol use: Not Currently    Drug use: No    Sexual activity: Yes     Partners: Female   Other Topics Concern    Not on file   Social History Narrative    Has grown stepson    Lives part time in [facility] and part time in [place] [id number] Resource Strain: Low Risk     Difficulty of Paying Living Expenses: Not hard at all   Food Insecurity: No Food Insecurity    Worried About [address] in the Last Year: Never true    [address] in the Last Year: Never true   Transportation Needs:     Lack of Transportation (Medical): Not on file    Lack of Transportation (Non-Medical):  Not on file   Physical Activity:     Days of Exercise per Week: Not on file    Minutes of Exercise per Session: Not on file   Stress:     Feeling of Stress : Not on file   Social Connections:     Frequency of Communication with Friends and Family: Not on file    Frequency of Social Gatherings with Friends and Family: Not on file    Attends [employer] Services: Not on file   [employer] or Organizations: Not on file    Attends Club or Organization Meetings: Not on file    Marital Status: Not on file   Intimate Partner Violence:     Fear of Current or Ex-Partner: Not on file    Emotionally Abused: Not on file    Physically Abused: Not on file    Sexually Abused: Not on file   Housing Stability:     Unable to Pay for Housing in the Last Year: Not on file    Number of [place] in the Last Year: Not on file    Unstable Housing in the Last Year: Not on file        Patient has a family history includes Colon Cancer in his father; Coronary Art Dis in his brother and brother; Other in his mother. Patient  has a past medical history of Anemia, Anxiety disorder, Aortic valve sclerosis, Colitis, Colon polyps, Coronary artery disease involving native coronary artery, COVID-19, Diabetes mellitus, type 2 ([address] Diabetic peripheral neuropathy ([facility] Diastolic CHF ([address] Emphysema/COPD ([address] Esophageal reflux, Essential hypertension, benign, Hyperlipidemia, IBS (irritable bowel syndrome), Impotence of organic origin, Insomnia, MGUS (monoclonal gammopathy of unknown significance), Neck pain, Nephrolithiasis, ESTEFANÍA (obstructive sleep apnea), PLMD (periodic limb movement disorder), Presbyopia, Psoriasis, Pulmonary nodules, and Thrombocytopenia, unspecified ([facility]      Current Outpatient Medications   Medication Sig Dispense Refill    olmesartan (BENICAR) 40 MG tablet TAKE 1 TABLET BY MOUTH DAILY 30 tablet 2    hydrALAZINE (APRESOLINE) 25 MG tablet Take 1 tablet by mouth 3 times daily as needed (SBP > 160 or DBP > 90 mmhg) 90 tablet 3    metFORMIN (GLUCOPHAGE-XR) 500 MG extended release tablet TAKE 1 TABLET BY MOUTH TWICE A DAY WITH FOOD 60 tablet 1    metoprolol tartrate (LOPRESSOR) 25 MG tablet TAKE 1 TABLET BY MOUTH TWICE A  tablet 2    spironolactone (ALDACTONE) 25 MG tablet TAKE 1/2 TABLET BY MOUTH EVERY DAY 45 tablet 3    atorvastatin (LIPITOR) 40 MG tablet TAKE 1/2 TABLET BY MOUTH EVERY DAY 45 tablet 1    furosemide (LASIX) 20 MG tablet TAKE 1 TABLET BY MOUTH EVERY DAY 90 tablet 3    aspirin 325 MG EC tablet Take 1 tablet by mouth daily 30 tablet 3    prazosin (MINIPRESS) 1 MG capsule TAKE 1 CAPSULE BY MOUTH EVERY DAY AT NIGHT 30 capsule 5    SITagliptin (JANUVIA) 100 MG tablet TAKE 1 TABLET BY MOUTH EVERY DAY 60 tablet 5    amLODIPine (NORVASC) 5 MG tablet Take 1 tablet by mouth daily Take daily at 7 pm 90 tablet 3    nitroGLYCERIN (NITROSTAT) 0.4 MG SL tablet Place 1 tablet under the tongue every 5 minutes as needed for Chest pain up to max of 3 total doses. If no relief after 1 dose, call 911. 25 tablet 0    chlorpheniramine maleate (CHLORPHEN SR) 12 MG TBCR extended release tablet Take 1 tablet by mouth every 12 hours as needed for Allergies 60 tablet 0    magnesium oxide (MAG-OX) 400 MG tablet Take 1 tablet by mouth daily 30 tablet 1    ketoconazole (NIZORAL) 2 % shampoo Apply topically daily as needed. 60 mL 1    Cholecalciferol (VITAMIN D) 2000 UNITS CAPS capsule Take by mouth      Omeprazole-Sodium Bicarbonate (ZEGERID OTC PO) 1 capsule by mouth at bedtime.  fluticasone (FLONASE) 50 MCG/ACT nasal spray INSTILL 2 SPRAYS INTO EACH NOSTRIL DAILY FOR SINUS CONGESTION 3 Bottle 3    halcinonide (HALOG) 0.1 % CREA Apply to rash twice a day as needed. 60 g 1    therapeutic multivitamin-minerals (THERAGRAN-M) tablet Take 1 tablet by mouth daily. No current facility-administered medications for this visit. Vitals  Weight: 186 lb 6.4 oz (84.6 kg)  Blood Pressure: BP: (144-152)/(80-86)    Pulse: 85       Review of Systems   Constitutional: Negative. HENT: Negative. Respiratory: Negative. Cardiovascular: Negative. Gastrointestinal: Negative. Genitourinary: Negative. Musculoskeletal: Negative. Skin: Negative. Allergic/Immunologic: Negative. Neurological: Negative. Hematological: Negative. Psychiatric/Behavioral: Negative.         Objective: Physical Exam   Constitutional: He is oriented to person, place, and time. He appears well-developed and well-nourished. HENT:   Head: Normocephalic and atraumatic. Eyes: Conjunctivae and EOM are normal. Pupils are equal, round, and reactive to light. Neck: Normal range of motion. Neck supple. Cardiovascular: Normal rate and regular rhythm. Exam reveals no friction rub. II/VI HS murmur heard. Pulmonary/Chest: Effort normal and breath sounds normal.   Abdominal: Soft. Bowel sounds are normal.   Musculoskeletal: Normal range of motion. He exhibits no edema or tenderness. Neurological: He is alert and oriented to person, place, and time. No cranial nerve deficit. Coordination normal.   Skin: Skin is warm. No rash noted. No erythema. No pallor. Psychiatric: He has a normal mood and affect. His behavior is normal. Judgment and thought content normal.       Assessment:      Diagnosis Orders   1. Hyperlipidemia, unspecified hyperlipidemia type     2. HTN (hypertension), benign     3. Coronary artery disease involving native coronary artery of native heart without angina pectoris       . Plan:      Continue current medications. Stable cardiovascular status after 5 v CABG on 2/1/16. Doing well. sr with old RBBB.  ecg unchanged. SOB:  Better with bp control  Palpitations:  Continue lopressor bid  HTN;  bp is high now. Restart Norvasc 5 mg daily and 25 mg tid hydralazine scheduled.   HLP:  ldl 53 on 1/21  Weakness and bruit check carotid US

## 2021-11-29 NOTE — TELEPHONE ENCOUNTER
Patient declined to schedule an appointment because he presented to the ER with Cardiac related symptoms. Patient states he had an ER Follow up with his cardiologist Dr. Flaca Watkins, and he can see where the information from that appointment was forwarded to Brian Sen. Patient wanted Dr. Antione Sorenson know he wasn't snubbing her, but that he felt following up with his cardiologist was the appropriate thing for him to do. \"

## 2021-12-02 RX ORDER — PRAZOSIN HYDROCHLORIDE 1 MG/1
CAPSULE ORAL
Qty: 90 CAPSULE | Refills: 1 | Status: SHIPPED | OUTPATIENT
Start: 2021-12-02 | End: 2022-02-23

## 2021-12-20 RX ORDER — METFORMIN HYDROCHLORIDE 500 MG/1
TABLET, EXTENDED RELEASE ORAL
Qty: 60 TABLET | Refills: 1 | Status: SHIPPED | OUTPATIENT
Start: 2021-12-20 | End: 2022-03-04

## 2022-01-24 RX ORDER — AMLODIPINE BESYLATE 5 MG/1
5 TABLET ORAL DAILY
Qty: 90 TABLET | Refills: 3 | Status: SHIPPED | OUTPATIENT
Start: 2022-01-24 | End: 2022-01-25

## 2022-01-24 NOTE — TELEPHONE ENCOUNTER
Requested Prescriptions     Pending Prescriptions Disp Refills    amLODIPine (NORVASC) 5 MG tablet [Pharmacy Med Name: AMLODIPINE BESYLATE 5 MG TAB] 90 tablet 3     Sig: TAKE 1 TABLET BY MOUTH DAILY TAKE DAILY AT 7 PM            Last Office Visit: 11/29/2021     Next Office Visit: 2/18/2022

## 2022-01-25 RX ORDER — AMLODIPINE BESYLATE 5 MG/1
5 TABLET ORAL DAILY
Qty: 90 TABLET | Refills: 3 | Status: SHIPPED | OUTPATIENT
Start: 2022-01-25

## 2022-02-13 DIAGNOSIS — I10 ESSENTIAL HYPERTENSION, BENIGN: ICD-10-CM

## 2022-02-14 RX ORDER — OLMESARTAN MEDOXOMIL 40 MG/1
TABLET ORAL
Qty: 30 TABLET | Refills: 2 | Status: SHIPPED | OUTPATIENT
Start: 2022-02-14 | End: 2022-03-21 | Stop reason: SDUPTHER

## 2022-02-18 ENCOUNTER — OFFICE VISIT (OUTPATIENT)
Dept: CARDIOLOGY CLINIC | Age: 77
End: 2022-02-18
Payer: MEDICARE

## 2022-02-18 VITALS
DIASTOLIC BLOOD PRESSURE: 78 MMHG | SYSTOLIC BLOOD PRESSURE: 130 MMHG | WEIGHT: 181 LBS | HEART RATE: 82 BPM | HEIGHT: 72 IN | BODY MASS INDEX: 24.52 KG/M2 | OXYGEN SATURATION: 93 %

## 2022-02-18 DIAGNOSIS — I25.10 CORONARY ARTERY DISEASE INVOLVING NATIVE CORONARY ARTERY OF NATIVE HEART WITHOUT ANGINA PECTORIS: Primary | ICD-10-CM

## 2022-02-18 DIAGNOSIS — I50.32 CHRONIC HEART FAILURE WITH PRESERVED EJECTION FRACTION (HCC): ICD-10-CM

## 2022-02-18 DIAGNOSIS — I10 PRIMARY HYPERTENSION: ICD-10-CM

## 2022-02-18 DIAGNOSIS — E78.2 MIXED HYPERLIPIDEMIA: ICD-10-CM

## 2022-02-18 PROCEDURE — 99214 OFFICE O/P EST MOD 30 MIN: CPT | Performed by: INTERNAL MEDICINE

## 2022-02-18 PROCEDURE — 93000 ELECTROCARDIOGRAM COMPLETE: CPT | Performed by: INTERNAL MEDICINE

## 2022-02-18 PROCEDURE — G8420 CALC BMI NORM PARAMETERS: HCPCS | Performed by: INTERNAL MEDICINE

## 2022-02-18 PROCEDURE — 1036F TOBACCO NON-USER: CPT | Performed by: INTERNAL MEDICINE

## 2022-02-18 PROCEDURE — 4040F PNEUMOC VAC/ADMIN/RCVD: CPT | Performed by: INTERNAL MEDICINE

## 2022-02-18 PROCEDURE — G8484 FLU IMMUNIZE NO ADMIN: HCPCS | Performed by: INTERNAL MEDICINE

## 2022-02-18 PROCEDURE — G8427 DOCREV CUR MEDS BY ELIG CLIN: HCPCS | Performed by: INTERNAL MEDICINE

## 2022-02-18 PROCEDURE — 1123F ACP DISCUSS/DSCN MKR DOCD: CPT | Performed by: INTERNAL MEDICINE

## 2022-02-18 RX ORDER — HYDRALAZINE HYDROCHLORIDE 25 MG/1
25 TABLET, FILM COATED ORAL 3 TIMES DAILY PRN
Qty: 90 TABLET | Refills: 3 | Status: SHIPPED | OUTPATIENT
Start: 2022-02-18 | End: 2022-05-02

## 2022-02-18 RX ORDER — CALCIUM CARBONATE 200(500)MG
1 TABLET,CHEWABLE ORAL DAILY
COMMUNITY

## 2022-02-18 RX ORDER — SPIRONOLACTONE 25 MG/1
TABLET ORAL
Qty: 45 TABLET | Refills: 3 | Status: SHIPPED | OUTPATIENT
Start: 2022-02-18

## 2022-02-18 NOTE — PROGRESS NOTES
Subjective:      Patient ID: Sonia Arellano is a 68 y.o. male. CC:  Follow up CAD    HPI Devin Rivas is s/p CABG and is intending to play golf this year. No cp nor syncope. No NTG use. bp is controlled today. Has weakness of extremities. Allergies   Allergen Reactions    Lisinopril Other (See Comments)     cough    Chlorthalidone      diarrhea    Sertraline      Felt unwell        Social History     Socioeconomic History    Marital status:      Spouse name: Ella Castillo Number of children: 0    Years of education: 25    Highest education level: Not on file   Occupational History    Occupation: - Retired    Tobacco Use    Smoking status: Former Smoker     Types: Cigarettes     Quit date: 1983     Years since quittin.1    Smokeless tobacco: Never Used    Tobacco comment: ~40 pack years, stopped at age 39   Substance and Sexual Activity    Alcohol use: Not Currently    Drug use: No    Sexual activity: Yes     Partners: Female   Other Topics Concern    Not on file   Social History Narrative    Has grown stepson    Lives part time in [facility] and part time in [place] [address] Resource Strain: Low Risk     Difficulty of Paying Living Expenses: Not hard at all   Food Insecurity: No Food Insecurity    Worried About [address] in the Last Year: Never true    [address] in the Last Year: Never true   Transportation Needs:     Lack of Transportation (Medical): Not on file    Lack of Transportation (Non-Medical):  Not on file   Physical Activity:     Days of Exercise per Week: Not on file    Minutes of Exercise per Session: Not on file   Stress:     Feeling of Stress : Not on file   Social Connections:     Frequency of Communication with Friends and Family: Not on file    Frequency of Social Gatherings with Friends and Family: Not on file    Attends [employer] Services: Not on file   [employer] or Organizations: Not on file    Attends Club or Organization Meetings: Not on file    Marital Status: Not on file   Intimate Partner Violence:     Fear of Current or Ex-Partner: Not on file    Emotionally Abused: Not on file    Physically Abused: Not on file    Sexually Abused: Not on file   Housing Stability:     Unable to Pay for Housing in the Last Year: Not on file    Number of [place] in the Last Year: Not on file    Unstable Housing in the Last Year: Not on file        Patient has a family history includes Colon Cancer in his father; Coronary Art Dis in his brother and brother; Other in his mother. Patient  has a past medical history of Anemia, Anxiety disorder, Aortic valve sclerosis, Colitis, Colon polyps, Coronary artery disease involving native coronary artery, COVID-19, Diabetes mellitus, type 2 ([address] Diabetic peripheral neuropathy ([facility] Diastolic CHF ([address] Emphysema/COPD ([address] Esophageal reflux, Essential hypertension, benign, Hyperlipidemia, IBS (irritable bowel syndrome), Impotence of organic origin, Insomnia, MGUS (monoclonal gammopathy of unknown significance), Neck pain, Nephrolithiasis, ESTEFANÍA (obstructive sleep apnea), PLMD (periodic limb movement disorder), Presbyopia, Psoriasis, Pulmonary nodules, and Thrombocytopenia, unspecified ([facility]      Current Outpatient Medications   Medication Sig Dispense Refill    calcium carbonate (TUMS) 500 MG chewable tablet Take 1 tablet by mouth daily      olmesartan (BENICAR) 40 MG tablet TAKE 1 TABLET BY MOUTH EVERY DAY 30 tablet 2    amLODIPine (NORVASC) 5 MG tablet Take 1 tablet by mouth daily Take daily at 7 pm 90 tablet 3    metFORMIN (GLUCOPHAGE-XR) 500 MG extended release tablet TAKE 1 TABLET BY MOUTH TWICE A DAY WITH FOOD 60 tablet 1    hydrALAZINE (APRESOLINE) 25 MG tablet Take 1 tablet by mouth 3 times daily as needed (SBP > 160 or DBP > 90 mmhg) 90 tablet 3    metoprolol tartrate (LOPRESSOR) 25 MG tablet TAKE 1 TABLET BY MOUTH TWICE A  tablet 2    spironolactone (ALDACTONE) 25 MG tablet TAKE 1/2 TABLET BY MOUTH EVERY DAY 45 tablet 3    atorvastatin (LIPITOR) 40 MG tablet TAKE 1/2 TABLET BY MOUTH EVERY DAY 45 tablet 1    furosemide (LASIX) 20 MG tablet TAKE 1 TABLET BY MOUTH EVERY DAY 90 tablet 3    aspirin 325 MG EC tablet Take 1 tablet by mouth daily 30 tablet 3    nitroGLYCERIN (NITROSTAT) 0.4 MG SL tablet Place 1 tablet under the tongue every 5 minutes as needed for Chest pain up to max of 3 total doses. If no relief after 1 dose, call 911. 25 tablet 0    chlorpheniramine maleate (CHLORPHEN SR) 12 MG TBCR extended release tablet Take 1 tablet by mouth every 12 hours as needed for Allergies 60 tablet 0    magnesium oxide (MAG-OX) 400 MG tablet Take 1 tablet by mouth daily 30 tablet 1    ketoconazole (NIZORAL) 2 % shampoo Apply topically daily as needed. 60 mL 1    Cholecalciferol (VITAMIN D) 2000 UNITS CAPS capsule Take by mouth      fluticasone (FLONASE) 50 MCG/ACT nasal spray INSTILL 2 SPRAYS INTO EACH NOSTRIL DAILY FOR SINUS CONGESTION 3 Bottle 3    therapeutic multivitamin-minerals (THERAGRAN-M) tablet Take 1 tablet by mouth daily.  prazosin (MINIPRESS) 1 MG capsule TAKE 1 CAPSULE BY MOUTH EVERY DAY AT NIGHT 90 capsule 1    SITagliptin (JANUVIA) 100 MG tablet TAKE 1 TABLET BY MOUTH EVERY DAY (Patient not taking: Reported on 2/18/2022) 60 tablet 5    Omeprazole-Sodium Bicarbonate (ZEGERID OTC PO) 1 capsule by mouth at bedtime. (Patient not taking: Reported on 2/18/2022)      halcinonide (HALOG) 0.1 % CREA Apply to rash twice a day as needed. (Patient not taking: Reported on 2/18/2022) 60 g 1     No current facility-administered medications for this visit. Vitals  Weight: 181 lb (82.1 kg)  Blood Pressure: BP: (130)/(78)    Pulse: 82       Review of Systems   Constitutional: Negative. HENT: Negative. Respiratory: Negative. Cardiovascular: Negative. Gastrointestinal: Negative. Genitourinary: Negative. Musculoskeletal: Negative. Skin: Negative. Allergic/Immunologic: Negative. Neurological: Negative. Hematological: Negative. Psychiatric/Behavioral: Negative. Objective:   Physical Exam   Constitutional: He is oriented to person, place, and time. He appears well-developed and well-nourished. HENT:   Head: Normocephalic and atraumatic. Eyes: Conjunctivae and EOM are normal. Pupils are equal, round, and reactive to light. Neck: Normal range of motion. Neck supple. Cardiovascular: Normal rate and regular rhythm. Exam reveals no friction rub. II/VI HS murmur heard. Pulmonary/Chest: Effort normal and breath sounds normal.   Abdominal: Soft. Bowel sounds are normal.   Musculoskeletal: Normal range of motion. He exhibits no edema or tenderness. Neurological: He is alert and oriented to person, place, and time. No cranial nerve deficit. Coordination normal.   Skin: Skin is warm. No rash noted. No erythema. No pallor. Psychiatric: He has a normal mood and affect. His behavior is normal. Judgment and thought content normal.       Assessment:      Diagnosis Orders   1. Coronary artery disease involving native coronary artery of native heart without angina pectoris  EKG 12 lead     . Plan:      Continue current medications. Stable cardiovascular status after 5 v CABG on 2/1/16. Doing well. sr with old RBBB.  ecg unchanged. SOB:  Better with bp control  Palpitations:  Continue lopressor bid  HTN;  bp is high now. Restart Norvasc 5 mg daily and 25 mg tid hydralazine scheduled. HLP:  ldl 53 on 1/21 check labs.   Weakness and bruit check carotid US

## 2022-02-23 ENCOUNTER — OFFICE VISIT (OUTPATIENT)
Dept: INTERNAL MEDICINE CLINIC | Age: 77
End: 2022-02-23
Payer: MEDICARE

## 2022-02-23 VITALS
WEIGHT: 181.6 LBS | DIASTOLIC BLOOD PRESSURE: 58 MMHG | HEART RATE: 76 BPM | SYSTOLIC BLOOD PRESSURE: 112 MMHG | BODY MASS INDEX: 24.63 KG/M2 | RESPIRATION RATE: 14 BRPM | OXYGEN SATURATION: 99 %

## 2022-02-23 DIAGNOSIS — D47.2 MONOCLONAL GAMMOPATHY: ICD-10-CM

## 2022-02-23 DIAGNOSIS — J43.2 CENTRILOBULAR EMPHYSEMA (HCC): ICD-10-CM

## 2022-02-23 DIAGNOSIS — D69.6 THROMBOCYTOPENIA (HCC): ICD-10-CM

## 2022-02-23 DIAGNOSIS — E11.42 TYPE 2 DIABETES MELLITUS WITH PERIPHERAL NEUROPATHY (HCC): Primary | ICD-10-CM

## 2022-02-23 DIAGNOSIS — I25.10 CORONARY ARTERY DISEASE INVOLVING NATIVE CORONARY ARTERY OF NATIVE HEART WITHOUT ANGINA PECTORIS: ICD-10-CM

## 2022-02-23 DIAGNOSIS — I10 ESSENTIAL HYPERTENSION, BENIGN: ICD-10-CM

## 2022-02-23 LAB
PROTEIN PROTEIN: <0.004 G/DL
PROTEIN PROTEIN: <4 MG/DL

## 2022-02-23 PROCEDURE — 1036F TOBACCO NON-USER: CPT | Performed by: INTERNAL MEDICINE

## 2022-02-23 PROCEDURE — 1123F ACP DISCUSS/DSCN MKR DOCD: CPT | Performed by: INTERNAL MEDICINE

## 2022-02-23 PROCEDURE — 99214 OFFICE O/P EST MOD 30 MIN: CPT | Performed by: INTERNAL MEDICINE

## 2022-02-23 PROCEDURE — G8484 FLU IMMUNIZE NO ADMIN: HCPCS | Performed by: INTERNAL MEDICINE

## 2022-02-23 PROCEDURE — 4040F PNEUMOC VAC/ADMIN/RCVD: CPT | Performed by: INTERNAL MEDICINE

## 2022-02-23 PROCEDURE — G8420 CALC BMI NORM PARAMETERS: HCPCS | Performed by: INTERNAL MEDICINE

## 2022-02-23 PROCEDURE — G8427 DOCREV CUR MEDS BY ELIG CLIN: HCPCS | Performed by: INTERNAL MEDICINE

## 2022-02-23 PROCEDURE — 3023F SPIROM DOC REV: CPT | Performed by: INTERNAL MEDICINE

## 2022-02-23 NOTE — PROGRESS NOTES
Alba Somers (:  1945) is a 68 y.o. male, here for evaluation of the following chief complaint(s):    Diabetes      ASSESSMENT/PLAN:  1. Type 2 diabetes mellitus with peripheral neuropathy (HCC)  Stable on Metformin and Januvia - Janumet 1000/100 samples given   -     Hemoglobin A1C; Future  -     Vitamin B12; Future  2. Centrilobular emphysema ([address]  Controlled without inhalers. 3. Coronary artery disease involving native coronary artery of native heart without angina pectoris  Stable on aspirin 325 mg (ok w cards)-statin  4. Hypertension, benign    Stable on beta blocker, Benicar, amlodipine, and spironolactone and Lasix, Hydralazine  5. Thrombocytopenia ([address]  -     CBC; Future  -     Electrophoresis Protein, Serum; Future  6. Monoclonal gammopathy  -     [facility]FL - Jesica Stands, DO, Hematology Oncology, [facility]  -     PROTEIN ELECTROPHORESIS, URINE; Future  -     Kappa/Lambda Quantitative Free Light Chains, Serum; Future  -     Path Interp Elec Urine    Return in about 4 months (around 2022). SUBJECTIVE/OBJECTIVE:  HPI   Is here for routine follow-up. He has been under continued stress related to his brother-in-law's estate. He states that Januvia has become expensive and he is trying to figure out a way to stay on it. Denies chest pain, shortness of breath, or dizziness. Past Medical History:   Diagnosis Date    Anemia     mild stable    Anxiety disorder     Aortic valve sclerosis     Colitis 2011    Etiology?     Colon polyps     Coronary artery disease involving native coronary artery 2016    sp cabg    COVID-19     x2[place] 3d time-in [place]    Diabetes mellitus, type 2 ([address] 2014    Diabetic peripheral neuropathy ([address]     Diastolic CHF ([address]     Emphysema/COPD ([address] 2014    Esophageal reflux     Essential hypertension, benign     Hyperlipidemia     IBS (irritable bowel syndrome)     Impotence of organic origin     Insomnia 05/13/2010    nightmares    MGUS (monoclonal gammopathy of unknown significance)     Neck pain 12/21/2015    Nephrolithiasis     ESTEFANÍA (obstructive sleep apnea)     PLMD (periodic limb movement disorder)     Presbyopia     right eye?  Psoriasis     Pulmonary nodules 11/05/2013    stable on fu    Thrombocytopenia, unspecified (HCC)        Current Outpatient Medications   Medication Sig Dispense Refill    calcium carbonate (TUMS) 500 MG chewable tablet Take 1 tablet by mouth daily      spironolactone (ALDACTONE) 25 MG tablet TAKE 1/2 TABLET BY MOUTH EVERY DAY 45 tablet 3    hydrALAZINE (APRESOLINE) 25 MG tablet Take 1 tablet by mouth 3 times daily as needed (SBP > 160 or DBP > 90 mmhg) 90 tablet 3    olmesartan (BENICAR) 40 MG tablet TAKE 1 TABLET BY MOUTH EVERY DAY 30 tablet 2    amLODIPine (NORVASC) 5 MG tablet Take 1 tablet by mouth daily Take daily at 7 pm 90 tablet 3    metFORMIN (GLUCOPHAGE-XR) 500 MG extended release tablet TAKE 1 TABLET BY MOUTH TWICE A DAY WITH FOOD 60 tablet 1    metoprolol tartrate (LOPRESSOR) 25 MG tablet TAKE 1 TABLET BY MOUTH TWICE A  tablet 2    atorvastatin (LIPITOR) 40 MG tablet TAKE 1/2 TABLET BY MOUTH EVERY DAY 45 tablet 1    furosemide (LASIX) 20 MG tablet TAKE 1 TABLET BY MOUTH EVERY DAY 90 tablet 3    aspirin 325 MG EC tablet Take 1 tablet by mouth daily 30 tablet 3    nitroGLYCERIN (NITROSTAT) 0.4 MG SL tablet Place 1 tablet under the tongue every 5 minutes as needed for Chest pain up to max of 3 total doses. If no relief after 1 dose, call 911. 25 tablet 0    chlorpheniramine maleate (CHLORPHEN SR) 12 MG TBCR extended release tablet Take 1 tablet by mouth every 12 hours as needed for Allergies 60 tablet 0    ketoconazole (NIZORAL) 2 % shampoo Apply topically daily as needed.  60 mL 1    Cholecalciferol (VITAMIN D) 2000 UNITS CAPS capsule Take by mouth      fluticasone (FLONASE) 50 MCG/ACT nasal spray INSTILL 2 SPRAYS INTO EACH NOSTRIL DAILY FOR SINUS CONGESTION 3 Bottle 3    halcinonide (HALOG) 0.1 % CREA Apply to rash twice a day as needed. 60 g 1    therapeutic multivitamin-minerals (THERAGRAN-M) tablet Take 1 tablet by mouth daily. No current facility-administered medications for this visit. Physical Exam  Vitals reviewed. Constitutional:       General: He is not in acute distress. Cardiovascular:      Rate and Rhythm: Normal rate and regular rhythm. Pulses: Normal pulses. Heart sounds: Normal heart sounds. Musculoskeletal:      Right lower leg: No edema. Left lower leg: No edema. Neurological:      General: No focal deficit present. Mental Status: He is alert and oriented to person, place, and time. Psychiatric:         Mood and Affect: Mood normal.               This note was generated completely or in part utilizing Dragon dictation speech recognition software. Occasionally, words are mistranscribed and despite editing, the text may contain inaccuracies due to incorrect word recognition. If further clarification is needed please contact the office at [phone]          An electronic signature was used to authenticate this note.     --Shreya Colon MD

## 2022-02-25 LAB — URINE ELECTROPHORESIS INTERP: NORMAL

## 2022-03-02 LAB
A/G RATIO: 2 (ref 1.1–2.2)
ALBUMIN SERPL-MCNC: 4.4 G/DL (ref 3.4–5)
ALP BLD-CCNC: 65 U/L (ref 40–129)
ALT SERPL-CCNC: 17 U/L (ref 10–40)
ANION GAP SERPL CALCULATED.3IONS-SCNC: 14 MMOL/L (ref 3–16)
AST SERPL-CCNC: 21 U/L (ref 15–37)
BILIRUB SERPL-MCNC: 0.5 MG/DL (ref 0–1)
BUN BLDV-MCNC: 26 MG/DL (ref 7–20)
CALCIUM SERPL-MCNC: 9.8 MG/DL (ref 8.3–10.6)
CHLORIDE BLD-SCNC: 101 MMOL/L (ref 99–110)
CHOLESTEROL, TOTAL: 161 MG/DL (ref 0–199)
CO2: 21 MMOL/L (ref 21–32)
CREAT SERPL-MCNC: 1.2 MG/DL (ref 0.8–1.3)
GFR AFRICAN AMERICAN: >60
GFR NON-AFRICAN AMERICAN: 59
GLUCOSE BLD-MCNC: 211 MG/DL (ref 70–99)
HDLC SERPL-MCNC: 55 MG/DL (ref 40–60)
LDL CHOLESTEROL CALCULATED: 58 MG/DL
POTASSIUM SERPL-SCNC: 4 MMOL/L (ref 3.5–5.1)
SODIUM BLD-SCNC: 136 MMOL/L (ref 136–145)
TOTAL PROTEIN: 6.6 G/DL (ref 6.4–8.2)
TRIGL SERPL-MCNC: 241 MG/DL (ref 0–150)
VLDLC SERPL CALC-MCNC: 48 MG/DL

## 2022-03-04 RX ORDER — SITAGLIPTIN AND METFORMIN HYDROCHLORIDE 100; 1000 MG/1; MG/1
1 TABLET, FILM COATED, EXTENDED RELEASE ORAL EVERY EVENING
Qty: 30 TABLET | Refills: 3
Start: 2022-03-04 | End: 2022-04-22 | Stop reason: SDUPTHER

## 2022-03-07 DIAGNOSIS — E78.5 HYPERLIPIDEMIA, UNSPECIFIED HYPERLIPIDEMIA TYPE: Primary | ICD-10-CM

## 2022-03-07 RX ORDER — ICOSAPENT ETHYL 1000 MG/1
2 CAPSULE ORAL 2 TIMES DAILY
Qty: 240 CAPSULE | Refills: 1 | Status: SHIPPED | OUTPATIENT
Start: 2022-03-07

## 2022-03-08 RX ORDER — ATORVASTATIN CALCIUM 40 MG/1
TABLET, FILM COATED ORAL
Qty: 45 TABLET | Refills: 1 | Status: SHIPPED | OUTPATIENT
Start: 2022-03-08 | End: 2022-08-29

## 2022-03-08 NOTE — TELEPHONE ENCOUNTER
Last appointment: 2/23/2022  Next appointment: Visit date not found  Last refill: 8/10/2021  not due back until 6/23/2022

## 2022-03-21 DIAGNOSIS — I10 ESSENTIAL HYPERTENSION, BENIGN: ICD-10-CM

## 2022-03-21 RX ORDER — OLMESARTAN MEDOXOMIL 40 MG/1
TABLET ORAL
Qty: 30 TABLET | Refills: 0 | Status: SHIPPED | OUTPATIENT
Start: 2022-03-21 | End: 2022-03-28

## 2022-03-21 NOTE — TELEPHONE ENCOUNTER
Last appointment: 2/23/2022  Next appointment: pt not scheduled for 4 month follow up  Last refill: 2/14/2022 with 2 [facility]     [facility]

## 2022-03-25 DIAGNOSIS — I10 ESSENTIAL HYPERTENSION, BENIGN: ICD-10-CM

## 2022-03-25 NOTE — TELEPHONE ENCOUNTER
Pt called to inquire on the script request    Needed to know if the pt is still in [place] or back in [place]     The pt was not available at the time of the office calling     M pertaining the above information

## 2022-03-28 RX ORDER — OLMESARTAN MEDOXOMIL 40 MG/1
TABLET ORAL
Qty: 30 TABLET | Refills: 1 | Status: SHIPPED | OUTPATIENT
Start: 2022-03-28 | End: 2022-06-13

## 2022-03-28 NOTE — TELEPHONE ENCOUNTER
Verified with pharmacy they just filled the last script on 3/21/22.  They are wanting more refills to place on file

## 2022-04-22 RX ORDER — SITAGLIPTIN AND METFORMIN HYDROCHLORIDE 100; 1000 MG/1; MG/1
1 TABLET, FILM COATED, EXTENDED RELEASE ORAL EVERY EVENING
Qty: 30 TABLET | Refills: 3 | Status: SHIPPED | OUTPATIENT
Start: 2022-04-22 | End: 2022-08-22

## 2022-04-22 NOTE — TELEPHONE ENCOUNTER
Last appointment: 2/23/2022  Next appointment: Visit date not found  Last refill:  Pt is requesting a refill on [facility]   this medication was last refilled on 11/23/221 30 day supply    it was transferred to [place] on 1/26/2022    In the pt's chart it is stated this medication was d/c'd on 2/23/2022 due to cost     The pt is requesting a refill on the [facility]   ( which was never picked up from the [address]    The pt prefers the Janumet due the metformin in it

## 2022-04-25 RX ORDER — SITAGLIPTIN 100 MG/1
TABLET, FILM COATED ORAL
Qty: 30 TABLET | Refills: 0 | Status: SHIPPED | OUTPATIENT
Start: 2022-04-25

## 2022-04-25 NOTE — TELEPHONE ENCOUNTER
Last appointment: 2/23/2022  Next appointment: Visit date not found  Last refill: 3/2/21  not due back until around 6/23/22

## 2022-05-02 RX ORDER — FUROSEMIDE 20 MG/1
TABLET ORAL
Qty: 90 TABLET | Refills: 3 | Status: SHIPPED | OUTPATIENT
Start: 2022-05-02 | End: 2022-05-31

## 2022-05-02 NOTE — TELEPHONE ENCOUNTER
Requested Prescriptions     Pending Prescriptions Disp Refills    metoprolol tartrate (LOPRESSOR) 25 MG tablet [Pharmacy Med Name: METOPROLOL TARTRATE 25 MG TAB] 180 tablet 3     Sig: TAKE 1 TABLET BY MOUTH TWICE A DAY    hydrALAZINE (APRESOLINE) 25 MG tablet [Pharmacy Med Name: HYDRALAZINE 25 MG TABLET] 270 tablet 3     Sig: TAKE 1 TABLET BY MOUTH 3 TIMES DAILY AS NEEDED (SBP > 160 OR DBP > 90 MMHG)                  Last Office Visit: 11/29/2021     Next Office Visit: 4/30/2022     Last Labs:03/03/2022

## 2022-05-02 NOTE — TELEPHONE ENCOUNTER
Requested Prescriptions     Pending Prescriptions Disp Refills    furosemide (LASIX) 20 MG tablet [Pharmacy Med Name: FUROSEMIDE 20 MG TABLET] 90 tablet 3     Sig: TAKE 1 TABLET BY MOUTH EVERY DAY                  Last Office Visit: 11/29/2021     Next Office Visit: 08/25/2022

## 2022-05-03 RX ORDER — HYDRALAZINE HYDROCHLORIDE 25 MG/1
25 TABLET, FILM COATED ORAL 3 TIMES DAILY PRN
Qty: 270 TABLET | Refills: 3 | Status: SHIPPED | OUTPATIENT
Start: 2022-05-03

## 2022-05-31 NOTE — TELEPHONE ENCOUNTER
Requested Prescriptions     Pending Prescriptions Disp Refills    furosemide (LASIX) 20 MG tablet [Pharmacy Med Name: FUROSEMIDE 20 MG TABLET] 90 tablet 3     Sig: TAKE 1 TABLET BY MOUTH EVERY DAY              Last Office Visit: 11/29/2021     Next Office Visit: 8/25/2022

## 2022-06-01 RX ORDER — FUROSEMIDE 20 MG/1
TABLET ORAL
Qty: 90 TABLET | Refills: 3 | Status: SHIPPED | OUTPATIENT
Start: 2022-06-01

## 2022-06-12 DIAGNOSIS — I10 ESSENTIAL HYPERTENSION, BENIGN: ICD-10-CM

## 2022-06-13 RX ORDER — OLMESARTAN MEDOXOMIL 40 MG/1
TABLET ORAL
Qty: 30 TABLET | Refills: 1 | Status: SHIPPED | OUTPATIENT
Start: 2022-06-13 | End: 2022-08-22

## 2022-08-21 DIAGNOSIS — I10 ESSENTIAL HYPERTENSION, BENIGN: ICD-10-CM

## 2022-08-22 RX ORDER — OLMESARTAN MEDOXOMIL 40 MG/1
TABLET ORAL
Qty: 30 TABLET | Refills: 1 | Status: SHIPPED | OUTPATIENT
Start: 2022-08-22

## 2022-08-22 RX ORDER — SITAGLIPTIN AND METFORMIN HYDROCHLORIDE 100; 1000 MG/1; MG/1
TABLET, FILM COATED, EXTENDED RELEASE ORAL
Qty: 30 TABLET | Refills: 1 | Status: SHIPPED | OUTPATIENT
Start: 2022-08-22 | End: 2022-10-12

## 2022-08-22 NOTE — TELEPHONE ENCOUNTER
Last appointment: 2/23/2022  Next appointment: Visit date not found  Last refill: 6/13/2022 benicar                    4/22/2022 janumet

## 2022-08-29 RX ORDER — ATORVASTATIN CALCIUM 40 MG/1
TABLET, FILM COATED ORAL
Qty: 45 TABLET | Refills: 0 | Status: SHIPPED | OUTPATIENT
Start: 2022-08-29 | End: 2022-10-24

## 2022-08-30 ENCOUNTER — TELEPHONE (OUTPATIENT)
Dept: INTERNAL MEDICINE CLINIC | Age: 77
End: 2022-08-30

## 2022-08-30 NOTE — TELEPHONE ENCOUNTER
Set up for phone visit with me tomorrow. I will have to review his meds and some of them may need to be stopped while he is on Paxlovid. Which urgent care did he go to?   If having severe symptoms, go to ER

## 2022-08-30 NOTE — TELEPHONE ENCOUNTER
Patient's wife is calling stating patient tested positive for COVID at Urgent Care today. They told him to contact PCP for Paxlovid. His SXS include:   Freezing-chills  Coughing  Throwing up (began today)  Nausea  Diarrhea   Runny Nose  Sore Throat   Muscle Aches  Chest Congestion     Patient is located in [address] [place] [address] [address] and would like a prescription called in. Advised patient's wife to have patient complete an Evisit on [employer] for this and they declined, stating they do not know how to do that. Patient's wife also did not want to do a VV and stated she would prefer a telephone visit. Did not schedule patient. Please contact them to further advise. Informed patients Dr. Chavira Back is out of office and are willing to speak with another Doctor.

## 2022-08-31 ENCOUNTER — SCHEDULED TELEPHONE ENCOUNTER (OUTPATIENT)
Dept: INTERNAL MEDICINE CLINIC | Age: 77
End: 2022-08-31
Payer: MEDICARE

## 2022-08-31 DIAGNOSIS — U07.1 COVID-19: Primary | ICD-10-CM

## 2022-08-31 PROCEDURE — 99442 PR PHYS/QHP TELEPHONE EVALUATION 11-20 MIN: CPT | Performed by: INTERNAL MEDICINE

## 2022-08-31 RX ORDER — DEXTROMETHORPHAN HYDROBROMIDE AND PROMETHAZINE HYDROCHLORIDE 15; 6.25 MG/5ML; MG/5ML
5 SYRUP ORAL 4 TIMES DAILY PRN
Qty: 140 ML | Refills: 0 | Status: SHIPPED | OUTPATIENT
Start: 2022-08-31 | End: 2022-09-07

## 2022-08-31 RX ORDER — BENZONATATE 100 MG/1
100 CAPSULE ORAL 3 TIMES DAILY PRN
Qty: 30 CAPSULE | Refills: 0 | Status: SHIPPED | OUTPATIENT
Start: 2022-08-31 | End: 2022-09-10

## 2022-08-31 RX ORDER — NIRMATRELVIR AND RITONAVIR 300-100 MG
KIT ORAL
Qty: 30 TABLET | Refills: 0 | Status: SHIPPED | OUTPATIENT
Start: 2022-08-31 | End: 2022-09-05

## 2022-08-31 SDOH — ECONOMIC STABILITY: FOOD INSECURITY: WITHIN THE PAST 12 MONTHS, THE FOOD YOU BOUGHT JUST DIDN'T LAST AND YOU DIDN'T HAVE MONEY TO GET MORE.: NEVER TRUE

## 2022-08-31 SDOH — ECONOMIC STABILITY: FOOD INSECURITY: WITHIN THE PAST 12 MONTHS, YOU WORRIED THAT YOUR FOOD WOULD RUN OUT BEFORE YOU GOT MONEY TO BUY MORE.: NEVER TRUE

## 2022-08-31 ASSESSMENT — PATIENT HEALTH QUESTIONNAIRE - PHQ9
SUM OF ALL RESPONSES TO PHQ9 QUESTIONS 1 & 2: 0
SUM OF ALL RESPONSES TO PHQ QUESTIONS 1-9: 0
SUM OF ALL RESPONSES TO PHQ QUESTIONS 1-9: 0
1. LITTLE INTEREST OR PLEASURE IN DOING THINGS: 0
SUM OF ALL RESPONSES TO PHQ QUESTIONS 1-9: 0
2. FEELING DOWN, DEPRESSED OR HOPELESS: 0
SUM OF ALL RESPONSES TO PHQ QUESTIONS 1-9: 0

## 2022-08-31 ASSESSMENT — SOCIAL DETERMINANTS OF HEALTH (SDOH): HOW HARD IS IT FOR YOU TO PAY FOR THE VERY BASICS LIKE FOOD, HOUSING, MEDICAL CARE, AND HEATING?: NOT HARD AT ALL

## 2022-08-31 NOTE — PROGRESS NOTES
Bart Cifuentes is a 68 y.o. male evaluated via telephone on 8/31/2022 for Other (Covid concerns )  . Documentation:  I communicated with the patient and/or health care decision maker about Covid. Details of this discussion including any medical advice provided:   Patient's symptoms started on August 29. He tested positive on a home test on August 30. His symptoms have included upset stomach, coughing, sore throat, myalgias and fatigue. He denies temperature or shortness of breath. Felicitas Yates was seen today for other. Diagnoses and all orders for this visit:    COVID-19  -     nirmatrelvir/ritonavir (PAXLOVID, 300/100,) 20 x 150 MG & 10 x 100MG TBPK; Take 3 tablets (two 150 mg nirmatrelvir and one 100 mg ritonavir tablets) by mouth every 12 hours for 5 days. -     benzonatate (TESSALON) 100 MG capsule; Take 1 capsule by mouth 3 times daily as needed for Cough  -     promethazine-dextromethorphan (PROMETHAZINE-DM) 6.25-15 MG/5ML syrup; Take 5 mLs by mouth 4 times daily as needed for Cough  No atorvastatin for 5 days while on paxlovid and for 5 more days  No Janumet for 5 days while on Paxlovid and for 2 more days        Total Time: minutes: 11-20 minutes    Bart Cifuentes was evaluated through a synchronous (real-time) audio encounter. Patient identification was verified at the start of the visit. He (or guardian if applicable) is aware that this is a billable service, which includes applicable co-pays. This visit was conducted with the patient's (and/or legal guardian's) verbal consent. He has not had a related appointment within my department in the past 7 days or scheduled within the next 24 hours. The patient was located at Home: [place] [address]. The provider was located at [facility] (Appt Dept): [address] [facility],  [address] [facility]     Note: not billable if this call serves to triage the patient into an appointment for the relevant concern    Shahnaz Souza MD

## 2022-08-31 NOTE — PATIENT INSTRUCTIONS
No atorvastatin for 5 days while on paxlovid and for 5 more days  No Janumet for 5 days while on Paxlovid and for 2 more days    Use albuterol inhaler Estimated Blood Loss (Cc): minimal

## 2022-09-19 ENCOUNTER — OFFICE VISIT (OUTPATIENT)
Dept: CARDIOLOGY CLINIC | Age: 77
End: 2022-09-19
Payer: MEDICARE

## 2022-09-19 VITALS
SYSTOLIC BLOOD PRESSURE: 128 MMHG | BODY MASS INDEX: 24.93 KG/M2 | WEIGHT: 183.8 LBS | HEART RATE: 67 BPM | DIASTOLIC BLOOD PRESSURE: 64 MMHG

## 2022-09-19 DIAGNOSIS — I25.10 CORONARY ARTERY DISEASE INVOLVING NATIVE CORONARY ARTERY OF NATIVE HEART WITHOUT ANGINA PECTORIS: Primary | ICD-10-CM

## 2022-09-19 DIAGNOSIS — E78.5 HYPERLIPIDEMIA, UNSPECIFIED HYPERLIPIDEMIA TYPE: ICD-10-CM

## 2022-09-19 DIAGNOSIS — I10 HTN (HYPERTENSION), BENIGN: ICD-10-CM

## 2022-09-19 PROBLEM — N18.30 CHRONIC RENAL DISEASE, STAGE III (HCC): Status: ACTIVE | Noted: 2022-09-19

## 2022-09-19 PROCEDURE — G8420 CALC BMI NORM PARAMETERS: HCPCS | Performed by: INTERNAL MEDICINE

## 2022-09-19 PROCEDURE — 1036F TOBACCO NON-USER: CPT | Performed by: INTERNAL MEDICINE

## 2022-09-19 PROCEDURE — G8427 DOCREV CUR MEDS BY ELIG CLIN: HCPCS | Performed by: INTERNAL MEDICINE

## 2022-09-19 PROCEDURE — 99214 OFFICE O/P EST MOD 30 MIN: CPT | Performed by: INTERNAL MEDICINE

## 2022-09-19 PROCEDURE — 1123F ACP DISCUSS/DSCN MKR DOCD: CPT | Performed by: INTERNAL MEDICINE

## 2022-09-19 NOTE — PROGRESS NOTES
Subjective:      Patient ID: Luci Capps is a 68 y.o. male. CC:  Follow up CAD    HPI Javier Finch is s/p CABG and is intending to play golf this year. No cp nor syncope. No NTG use. bp is controlled today. Has weakness of extremities. Allergies   Allergen Reactions    Lisinopril Other (See Comments)     cough    Chlorthalidone      diarrhea    Sertraline      Felt unwell        Social History     Socioeconomic History    Marital status:      Spouse name: Recardo Drivers    Number of children: 0    Years of education: 22    Highest education level: Not on file   Occupational History    Occupation: - Retired    Tobacco Use    Smoking status: Former     Types: Cigarettes     Quit date: 1983     Years since quittin.7    Smokeless tobacco: Never    Tobacco comments:     ~40 pack years, stopped at age 39   Substance and Sexual Activity    Alcohol use: Not Currently    Drug use: No    Sexual activity: Yes     Partners: Female   Other Topics Concern    Not on file   Social History Narrative    Has grown stepson    Lives part time in [facility] and part time in [place] [address] Resource Strain: Low Risk     Difficulty of Paying Living Expenses: Not hard at all   Food Insecurity: No Food Insecurity    Worried About Running Out of Food in the Last Year: Never true    Ran Out of Food in the Last Year: Never true   Transportation Needs: Not on file   Physical Activity: Not on file   Stress: Not on file   Social Connections: Not on file   Intimate Partner Violence: Not on file   Housing Stability: Not on file        Patient has a family history includes Colon Cancer in his father; Coronary Art Dis in his brother and brother; Other in his mother.     Patient  has a past medical history of Anemia, Anxiety disorder, Aortic valve sclerosis, Colitis, Colon polyps, Coronary artery disease involving native coronary artery, COVID-19, Diabetes mellitus, type 2 ([facility] [address] Diabetic peripheral neuropathy ([facility] [address] Diastolic CHF ([facility] [address] Emphysema/COPD ([facility] [address] Esophageal reflux, Essential hypertension, benign, Hyperlipidemia, IBS (irritable bowel syndrome), Impotence of organic origin, Insomnia, MGUS (monoclonal gammopathy of unknown significance), Neck pain, Nephrolithiasis, ESTEFANÍA (obstructive sleep apnea), PLMD (periodic limb movement disorder), Presbyopia, Psoriasis, Pulmonary nodules, and Thrombocytopenia, unspecified ([facility] Current Outpatient Medications   Medication Sig Dispense Refill    atorvastatin (LIPITOR) 40 MG tablet TAKE 1/2 TABLET BY MOUTH EVERY DAY 45 tablet 0    JANUMET -1000 MG TB24 TAKE 1 TABLET BY MOUTH EVERY EVENING WITH DINNER. 30 tablet 1    olmesartan (BENICAR) 40 MG tablet TAKE 1 TABLET BY MOUTH EVERY DAY 30 tablet 1    furosemide (LASIX) 20 MG tablet TAKE 1 TABLET BY MOUTH EVERY DAY 90 tablet 3    metoprolol tartrate (LOPRESSOR) 25 MG tablet TAKE 1 TABLET BY MOUTH TWICE A  tablet 3    hydrALAZINE (APRESOLINE) 25 MG tablet TAKE 1 TABLET BY MOUTH 3 TIMES DAILY AS NEEDED (SBP > 160 OR DBP > 90 MMHG) 270 tablet 3    JANUVIA 100 MG tablet TAKE 1 TABLET BY MOUTH EVERY DAY 30 tablet 0    Icosapent Ethyl (VASCEPA) 1 g CAPS capsule Take 2 capsules by mouth 2 times daily 240 capsule 1    calcium carbonate (TUMS) 500 MG chewable tablet Take 1 tablet by mouth daily      spironolactone (ALDACTONE) 25 MG tablet TAKE 1/2 TABLET BY MOUTH EVERY DAY 45 tablet 3    amLODIPine (NORVASC) 5 MG tablet Take 1 tablet by mouth daily Take daily at 7 pm 90 tablet 3    aspirin 325 MG EC tablet Take 1 tablet by mouth daily 30 tablet 3    nitroGLYCERIN (NITROSTAT) 0.4 MG SL tablet Place 1 tablet under the tongue every 5 minutes as needed for Chest pain up to max of 3 total doses.  If no relief after 1 dose, call 911. 25 tablet 0    chlorpheniramine maleate (CHLORPHEN SR) 12 MG TBCR extended release tablet Take 1 tablet by mouth every 12 hours as needed for Allergies 60 tablet 0 ketoconazole (NIZORAL) 2 % shampoo Apply topically daily as needed. 60 mL 1    Cholecalciferol (VITAMIN D) 2000 UNITS CAPS capsule Take by mouth      fluticasone (FLONASE) 50 MCG/ACT nasal spray INSTILL 2 SPRAYS INTO EACH NOSTRIL DAILY FOR SINUS CONGESTION 3 Bottle 3    halcinonide (HALOG) 0.1 % CREA Apply to rash twice a day as needed. 60 g 1    therapeutic multivitamin-minerals (THERAGRAN-M) tablet Take 1 tablet by mouth daily. No current facility-administered medications for this visit. Vitals  Weight: 183 lb 12.8 oz (83.4 kg)  Blood Pressure: BP: (128)/(64)    Pulse: 67       Review of Systems   Constitutional: Negative. HENT: Negative. Respiratory: Negative. Cardiovascular: Negative. Gastrointestinal: Negative. Genitourinary: Negative. Musculoskeletal: Negative. Skin: Negative. Allergic/Immunologic: Negative. Neurological: Negative. Hematological: Negative. Psychiatric/Behavioral: Negative. Objective:   Physical Exam   Constitutional: He is oriented to person, place, and time. He appears well-developed and well-nourished. HENT:   Head: Normocephalic and atraumatic. Eyes: Conjunctivae and EOM are normal. Pupils are equal, round, and reactive to light. Neck: Normal range of motion. Neck supple. Cardiovascular: Normal rate and regular rhythm. Exam reveals no friction rub. II/VI HS murmur heard. Pulmonary/Chest: Effort normal and breath sounds normal.   Abdominal: Soft. Bowel sounds are normal.   Musculoskeletal: Normal range of motion. He exhibits no edema or tenderness. Neurological: He is alert and oriented to person, place, and time. No cranial nerve deficit. Coordination normal.   Skin: Skin is warm. No rash noted. No erythema. No pallor. Psychiatric: He has a normal mood and affect. His behavior is normal. Judgment and thought content normal.       Assessment:      Diagnosis Orders   1.  Coronary artery disease involving native coronary artery of native heart without angina pectoris        2. HTN (hypertension), benign        3. Hyperlipidemia, unspecified hyperlipidemia type                Plan:      Continue current medications. Stable cardiovascular status after 5 v CABG on 2/1/16. Doing well. sr with old RBBB.  ecg unchanged. SOB:  Better with bp control  Palpitations:  Continue lopressor bid  HTN;  bp is high now. Restart Norvasc 5 mg daily scheduled. HLP:  ldl 53 on 1/21 check labs.   Weakness and bruit check carotid US

## 2022-10-12 RX ORDER — SITAGLIPTIN AND METFORMIN HYDROCHLORIDE 100; 1000 MG/1; MG/1
TABLET, FILM COATED, EXTENDED RELEASE ORAL
Qty: 30 TABLET | Refills: 1 | Status: SHIPPED | OUTPATIENT
Start: 2022-10-12

## 2022-10-24 RX ORDER — ATORVASTATIN CALCIUM 40 MG/1
TABLET, FILM COATED ORAL
Qty: 45 TABLET | Refills: 0 | Status: SHIPPED | OUTPATIENT
Start: 2022-10-24

## 2022-10-24 NOTE — TELEPHONE ENCOUNTER
Requested Prescriptions     Pending Prescriptions Disp Refills    atorvastatin (LIPITOR) 40 MG tablet [Pharmacy Med Name: ATORVASTATIN 40 MG TABLET] 45 tablet 0     Sig: TAKE 1/2 TABLET BY MOUTH EVERY DAY     Last OV - 8/31/22 (VV)  Next OV - NONE  Last refill - 8/29/22  Last labs -3/2/22

## 2022-11-19 DIAGNOSIS — I10 ESSENTIAL HYPERTENSION, BENIGN: ICD-10-CM

## 2022-11-20 RX ORDER — OLMESARTAN MEDOXOMIL 40 MG/1
TABLET ORAL
Qty: 30 TABLET | Refills: 0 | Status: SHIPPED | OUTPATIENT
Start: 2022-11-20

## 2022-11-25 RX ORDER — ICOSAPENT ETHYL 1000 MG/1
2 CAPSULE ORAL 2 TIMES DAILY
Qty: 120 CAPSULE | Refills: 5 | Status: SHIPPED | OUTPATIENT
Start: 2022-11-25

## 2022-11-25 NOTE — TELEPHONE ENCOUNTER
[facility] Medication Refills:    Icosapent Ethyl (VASCEPA) 1 g CAPS capsule [[id number]]     Order Details  Dose: 2 capsule Route: Oral Frequency: 2 TIMES DAILY   Dispense Quantity: 240 capsule Refills: 1          Sig: Take 2 capsules by mouth 2 times daily           Pharmacy    [address], [place]   [address]   Phone:  [phone]  Fax:  [phone]       Last Office Visit: 09/19/22    Next Office Visit: 03/30/23    Last Refill: 03/07/22    Last Labs: 03/02/22    Please fill the patient is out of medication

## 2022-12-15 DIAGNOSIS — I10 ESSENTIAL HYPERTENSION, BENIGN: ICD-10-CM

## 2022-12-15 RX ORDER — OLMESARTAN MEDOXOMIL 40 MG/1
TABLET ORAL
Qty: 30 TABLET | Refills: 0 | Status: SHIPPED | OUTPATIENT
Start: 2022-12-15 | End: 2023-01-09

## 2022-12-16 RX ORDER — SITAGLIPTIN AND METFORMIN HYDROCHLORIDE 1000; 100 MG/1; MG/1
TABLET, FILM COATED, EXTENDED RELEASE ORAL
Qty: 30 TABLET | Refills: 0 | Status: SHIPPED | OUTPATIENT
Start: 2022-12-16 | End: 2023-01-16 | Stop reason: SDUPTHER

## 2022-12-16 NOTE — TELEPHONE ENCOUNTER
Last appointment: 8/31/2022  Next appointment: Visit date not found  Last refill: 10/12/22   Sent lark message to schedule due/overdue appointment.

## 2023-01-07 DIAGNOSIS — I10 ESSENTIAL HYPERTENSION, BENIGN: ICD-10-CM

## 2023-01-09 RX ORDER — OLMESARTAN MEDOXOMIL 40 MG/1
TABLET ORAL
Qty: 30 TABLET | Refills: 0 | Status: SHIPPED | OUTPATIENT
Start: 2023-01-09

## 2023-01-16 RX ORDER — SITAGLIPTIN AND METFORMIN HYDROCHLORIDE 1000; 100 MG/1; MG/1
TABLET, FILM COATED, EXTENDED RELEASE ORAL
Qty: 30 TABLET | Refills: 0 | Status: SHIPPED | OUTPATIENT
Start: 2023-01-16 | End: 2023-02-03

## 2023-01-16 RX ORDER — SITAGLIPTIN AND METFORMIN HYDROCHLORIDE 100; 1000 MG/1; MG/1
TABLET, FILM COATED, EXTENDED RELEASE ORAL
Qty: 30 TABLET | Refills: 0 | OUTPATIENT
Start: 2023-01-16

## 2023-01-16 RX ORDER — SITAGLIPTIN AND METFORMIN HYDROCHLORIDE 1000; 100 MG/1; MG/1
TABLET, FILM COATED, EXTENDED RELEASE ORAL
Qty: 30 TABLET | Refills: 0 | OUTPATIENT
Start: 2023-01-16

## 2023-01-16 NOTE — TELEPHONE ENCOUNTER
Pharmacist is calling to request a refill on the following message for the patient:  JANUMET -1000 MG TB24  Last appointment: 8/31/2022  Next appointment: 2/3/2023  Last refill: 12/16/2022      Please call in to:  [facility] [address], [address] [place] [address] Xavier Martinez   [address] [facility], [address] [place] [address]   Phone:  [phone]  Fax:  [phone]    Please contact patient with any additional questions.

## 2023-01-16 NOTE — TELEPHONE ENCOUNTER
Pharmacist is calling regarding this refill request. She states she is receiving a message that this prescription request has been denied and patient is out of this medication. She states the denial message does not provide a reason for being denied. Please contact pharmacist at [phone] with any questions.

## 2023-02-01 RX ORDER — AMLODIPINE BESYLATE 5 MG/1
5 TABLET ORAL DAILY
Qty: 90 TABLET | Refills: 2 | Status: SHIPPED | OUTPATIENT
Start: 2023-02-01

## 2023-02-03 ENCOUNTER — OFFICE VISIT (OUTPATIENT)
Dept: INTERNAL MEDICINE CLINIC | Age: 78
End: 2023-02-03

## 2023-02-03 VITALS
SYSTOLIC BLOOD PRESSURE: 120 MMHG | HEART RATE: 68 BPM | OXYGEN SATURATION: 96 % | HEIGHT: 69 IN | WEIGHT: 188.4 LBS | BODY MASS INDEX: 27.91 KG/M2 | DIASTOLIC BLOOD PRESSURE: 64 MMHG

## 2023-02-03 DIAGNOSIS — R01.1 CARDIAC MURMUR: ICD-10-CM

## 2023-02-03 DIAGNOSIS — J43.2 CENTRILOBULAR EMPHYSEMA (HCC): ICD-10-CM

## 2023-02-03 DIAGNOSIS — E11.42 TYPE 2 DIABETES MELLITUS WITH PERIPHERAL NEUROPATHY (HCC): ICD-10-CM

## 2023-02-03 DIAGNOSIS — N18.30 STAGE 3 CHRONIC KIDNEY DISEASE, UNSPECIFIED WHETHER STAGE 3A OR 3B CKD (HCC): ICD-10-CM

## 2023-02-03 DIAGNOSIS — I10 ESSENTIAL HYPERTENSION, BENIGN: ICD-10-CM

## 2023-02-03 DIAGNOSIS — Z00.00 MEDICARE ANNUAL WELLNESS VISIT, SUBSEQUENT: Primary | ICD-10-CM

## 2023-02-03 DIAGNOSIS — D69.6 THROMBOCYTOPENIA (HCC): ICD-10-CM

## 2023-02-03 DIAGNOSIS — I50.32 CHRONIC HEART FAILURE WITH PRESERVED EJECTION FRACTION (HCC): ICD-10-CM

## 2023-02-03 RX ORDER — OLMESARTAN MEDOXOMIL 40 MG/1
TABLET ORAL
Qty: 30 TABLET | Refills: 3 | Status: SHIPPED | OUTPATIENT
Start: 2023-02-03

## 2023-02-03 RX ORDER — METFORMIN HYDROCHLORIDE 500 MG/1
1500 TABLET, EXTENDED RELEASE ORAL
Qty: 90 TABLET | Refills: 1 | Status: SHIPPED | OUTPATIENT
Start: 2023-02-03 | End: 2023-03-05

## 2023-02-03 SDOH — ECONOMIC STABILITY: HOUSING INSECURITY
IN THE LAST 12 MONTHS, WAS THERE A TIME WHEN YOU DID NOT HAVE A STEADY PLACE TO SLEEP OR SLEPT IN A SHELTER (INCLUDING NOW)?: NO

## 2023-02-03 SDOH — ECONOMIC STABILITY: FOOD INSECURITY: WITHIN THE PAST 12 MONTHS, YOU WORRIED THAT YOUR FOOD WOULD RUN OUT BEFORE YOU GOT MONEY TO BUY MORE.: NEVER TRUE

## 2023-02-03 SDOH — ECONOMIC STABILITY: INCOME INSECURITY: HOW HARD IS IT FOR YOU TO PAY FOR THE VERY BASICS LIKE FOOD, HOUSING, MEDICAL CARE, AND HEATING?: NOT HARD AT ALL

## 2023-02-03 SDOH — ECONOMIC STABILITY: FOOD INSECURITY: WITHIN THE PAST 12 MONTHS, THE FOOD YOU BOUGHT JUST DIDN'T LAST AND YOU DIDN'T HAVE MONEY TO GET MORE.: NEVER TRUE

## 2023-02-03 ASSESSMENT — PATIENT HEALTH QUESTIONNAIRE - PHQ9
SUM OF ALL RESPONSES TO PHQ QUESTIONS 1-9: 0
SUM OF ALL RESPONSES TO PHQ QUESTIONS 1-9: 0
2. FEELING DOWN, DEPRESSED OR HOPELESS: 0
SUM OF ALL RESPONSES TO PHQ9 QUESTIONS 1 & 2: 0
SUM OF ALL RESPONSES TO PHQ QUESTIONS 1-9: 0
1. LITTLE INTEREST OR PLEASURE IN DOING THINGS: 0
SUM OF ALL RESPONSES TO PHQ QUESTIONS 1-9: 0

## 2023-02-03 ASSESSMENT — LIFESTYLE VARIABLES
HOW OFTEN DO YOU HAVE A DRINK CONTAINING ALCOHOL: MONTHLY OR LESS
HOW MANY STANDARD DRINKS CONTAINING ALCOHOL DO YOU HAVE ON A TYPICAL DAY: 1 OR 2

## 2023-02-03 NOTE — PATIENT INSTRUCTIONS
Audiology, eye doctor, living will    Labs    Metformin 1500 mg per day    Echo  [phone]    Call GI doctor to see when colonoscopy follow up is   --  Learning About Vision Tests  What are vision tests? The four most common vision tests are visual acuity tests, refraction, visual field tests, and color vision tests. Visual acuity (sharpness) tests  These tests are used: To see if you need glasses or contact lenses. To monitor an eye problem. To check an eye injury. Visual acuity tests are done as part of routine exams. You may also have this test when you get your 's license or apply for some types of jobs. Visual field tests  These tests are used: To check for vision loss in any area of your range of vision. To screen for certain eye diseases. To look for nerve damage after a stroke, head injury, or other problem that could reduce blood flow to the brain. Refraction and color tests  A refraction test is done to find the right prescription for glasses and contact lenses. A color vision test is done to check for color blindness. Color vision is often tested as part of a routine exam. You may also have this test when you apply for a job where recognizing different colors is important, such as , electronics, or the [employer]. How are vision tests done? Visual acuity test   You cover one eye at a time. You read aloud from a wall chart across the room. You read aloud from a small card that you hold in your hand. Refraction   You look into a special device. The device puts lenses of different strengths in front of each eye to see how strong your glasses or contact lenses need to be. Visual field tests   Your doctor may have you look through special machines. Or your doctor may simply have you stare straight ahead while they move a finger into and out of your field of vision. Color vision test   You look at pieces of printed test patterns in various colors.  You say what number or symbol you see. Your doctor may have you trace the number or symbol using a pointer. How do these tests feel? There is very little chance of having a problem from this test. If dilating drops are used for a vision test, they may make the eyes sting and cause a medicine taste in the mouth. Follow-up care is a key part of your treatment and safety. Be sure to make and go to all appointments, and call your doctor if you are having problems. It's also a good idea to know your test results and keep a list of the medicines you take. Where can you learn more? Go to http://IROCKE.mccormick.com/ and enter G551 to learn more about \"Learning About Vision Tests. \"  Current as of: October 12, 2022               Content Version: 13.5  © 9588-4992 [employer]. Care instructions adapted under license by [employer]. If you have questions about a medical condition or this instruction, always ask your healthcare professional. [place] [address] any warranty or liability for your use of this information. Advance Directives: Care Instructions  Overview  An advance directive is a legal way to state your wishes at the end of your life. It tells your family and your doctor what to do if you can't say what you want. There are two main types of advance directives. You can change them any time your wishes change. Living will. This form tells your family and your doctor your wishes about life support and other treatment. The form is also called a declaration. Medical power of . This form lets you name a person to make treatment decisions for you when you can't speak for yourself. This person is called a health care agent (health care proxy, health care surrogate). The form is also called a durable power of  for health care.   If you do not have an advance directive, decisions about your medical care may be made by a family member, or by a doctor or a  who doesn't know you. It may help to think of an advance directive as a gift to the people who care for you. If you have one, they won't have to make tough decisions by themselves. For more information, including forms for your state, see the [address] website (www.caringinfo.org/planning/advance-directives/). Follow-up care is a key part of your treatment and safety. Be sure to make and go to all appointments, and call your doctor if you are having problems. It's also a good idea to know your test results and keep a list of the medicines you take. What should you include in an advance directive? Many states have a unique advance directive form. (It may ask you to address specific issues.) Or you might use a universal form that's approved by many states. If your form doesn't tell you what to address, it may be hard to know what to include in your advance directive. Use the questions below to help you get started. Who do you want to make decisions about your medical care if you are not able to? What life-support measures do you want if you have a serious illness that gets worse over time or can't be cured? What are you most afraid of that might happen? (Maybe you're afraid of having pain, losing your independence, or being kept alive by machines.)  Where would you prefer to die? (Your home? A hospital? A nursing home?)  Do you want to donate your organs when you die? Do you want certain Oriental orthodox practices performed before you die? When should you call for help? Be sure to contact your doctor if you have any questions. Where can you learn more? Go to http://www.mccormick.com/ and enter R264 to learn more about \"Advance Directives: Care Instructions. \"  Current as of: June 16, 2022               Content Version: 13.5  © 7318-3111 Healthwise, [employer]. Care instructions adapted under license by [facility] ([facility]).  If you have questions about a medical condition or this instruction, always ask your healthcare professional. [address] any warranty or liability for your use of this information. A Healthy Heart: Care Instructions  Your Care Instructions     Coronary artery disease, also called heart disease, occurs when a substance called plaque builds up in the vessels that supply oxygen-rich blood to your heart muscle. This can narrow the blood vessels and reduce blood flow. A heart attack happens when blood flow is completely blocked. A high-fat diet, smoking, and other factors increase the risk of heart disease. Your doctor has found that you have a chance of having heart disease. You can do lots of things to keep your heart healthy. It may not be easy, but you can change your diet, exercise more, and quit smoking. These steps really work to lower your chance of heart disease. Follow-up care is a key part of your treatment and safety. Be sure to make and go to all appointments, and call your doctor if you are having problems. It's also a good idea to know your test results and keep a list of the medicines you take. How can you care for yourself at home? Diet    Use less salt when you cook and eat. This helps lower your blood pressure. Taste food before salting. Add only a little salt when you think you need it. With time, your taste buds will adjust to less salt. Eat fewer snack items, fast foods, canned soups, and other high-salt, high-fat, processed foods. Read food labels and try to avoid saturated and trans fats. They increase your risk of heart disease by raising cholesterol levels. Limit the amount of solid fat-butter, margarine, and shortening-you eat. Use olive, peanut, or canola oil when you cook. Bake, broil, and steam foods instead of frying them. Eat a variety of fruit and vegetables every day. Dark green, deep orange, red, or yellow fruits and vegetables are especially good for you. Examples include spinach, carrots, peaches, and berries. Foods high in fiber can reduce your cholesterol and provide important vitamins and minerals. High-fiber foods include whole-grain cereals and breads, oatmeal, beans, brown rice, citrus fruits, and apples. Eat lean proteins. Heart-healthy proteins include seafood, lean meats and poultry, eggs, beans, peas, nuts, seeds, and soy products. Limit drinks and foods with added sugar. These include candy, desserts, and soda pop. Lifestyle changes    If your doctor recommends it, get more exercise. Walking is a good choice. Bit by bit, increase the amount you walk every day. Try for at least 30 minutes on most days of the week. You also may want to swim, bike, or do other activities. Do not smoke. If you need help quitting, talk to your doctor about stop-smoking programs and medicines. These can increase your chances of quitting for good. Quitting smoking may be the most important step you can take to protect your heart. It is never too late to quit. Limit alcohol to 2 drinks a day for men and 1 drink a day for women. Too much alcohol can cause health problems. Manage other health problems such as diabetes, high blood pressure, and high cholesterol. If you think you may have a problem with alcohol or drug use, talk to your doctor. Medicines    Take your medicines exactly as prescribed. Call your doctor if you think you are having a problem with your medicine. If your doctor recommends aspirin, take the amount directed each day. Make sure you take aspirin and not another kind of pain reliever, such as acetaminophen (Tylenol). When should you call for help? Call 911 if you have symptoms of a heart attack. These may include:    Chest pain or pressure, or a strange feeling in the chest.     Sweating. Shortness of breath. Pain, pressure, or a strange feeling in the back, neck, jaw, or upper belly or in one or both shoulders or arms. Lightheadedness or sudden weakness.      A fast or irregular heartbeat. After you call 911, the  may tell you to chew 1 adult-strength or 2 to 4 low-dose aspirin. Wait for an ambulance. Do not try to drive yourself. Watch closely for changes in your health, and be sure to contact your doctor if you have any problems. Where can you learn more? Go to http://www.mccormick.com/ and enter F075 to learn more about \"A Healthy Heart: Care Instructions. \"  Current as of: September 7, 2022               Content Version: 13.5  © 1254-8434 Healthwise, [employer]. Care instructions adapted under license by [facility] ([facility]). If you have questions about a medical condition or this instruction, always ask your healthcare professional. [address] any warranty or liability for your use of this information. Personalized Preventive Plan for Crystal Roberts - 2/3/2023  Medicare offers a range of preventive health benefits. Some of the tests and screenings are paid in full while other may be subject to a deductible, co-insurance, and/or copay. Some of these benefits include a comprehensive review of your medical history including lifestyle, illnesses that may run in your family, and various assessments and screenings as appropriate. After reviewing your medical record and screening and assessments performed today your provider may have ordered immunizations, labs, imaging, and/or referrals for you. A list of these orders (if applicable) as well as your Preventive Care list are included within your After Visit Summary for your review. Other Preventive Recommendations:    A preventive eye exam performed by an eye specialist is recommended every 1-2 years to screen for glaucoma; cataracts, macular degeneration, and other eye disorders. A preventive dental visit is recommended every 6 months. Try to get at least 150 minutes of exercise per week or 10,000 steps per day on a pedometer .   Order or download the FREE \"Exercise & Physical Activity: Your Everyday Guide\" from [employer]. Call 1-581.345.2939 or search [employer] online. You need 4766-6403 mg of calcium and 5209-6242 IU of vitamin D per day. It is possible to meet your calcium requirement with diet alone, but a vitamin D supplement is usually necessary to meet this goal.  When exposed to the sun, use a sunscreen that protects against both UVA and UVB radiation with an SPF of 30 or greater. Reapply every 2 to 3 hours or after sweating, drying off with a towel, or swimming. Always wear a seat belt when traveling in a car. Always wear a helmet when riding a bicycle or motorcycle.
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
## 2023-02-07 DIAGNOSIS — E11.42 TYPE 2 DIABETES MELLITUS WITH PERIPHERAL NEUROPATHY (HCC): ICD-10-CM

## 2023-02-08 LAB
A/G RATIO: 1.9 (ref 1.1–2.2)
ALBUMIN SERPL-MCNC: 4.2 G/DL (ref 3.4–5)
ALP BLD-CCNC: 66 U/L (ref 40–129)
ALT SERPL-CCNC: 22 U/L (ref 10–40)
ANION GAP SERPL CALCULATED.3IONS-SCNC: 14 MMOL/L (ref 3–16)
AST SERPL-CCNC: 21 U/L (ref 15–37)
BILIRUB SERPL-MCNC: 0.6 MG/DL (ref 0–1)
BUN BLDV-MCNC: 31 MG/DL (ref 7–20)
CALCIUM SERPL-MCNC: 9.5 MG/DL (ref 8.3–10.6)
CHLORIDE BLD-SCNC: 103 MMOL/L (ref 99–110)
CHOLESTEROL, TOTAL: 142 MG/DL (ref 0–199)
CO2: 21 MMOL/L (ref 21–32)
CREAT SERPL-MCNC: 1.5 MG/DL (ref 0.8–1.3)
ESTIMATED AVERAGE GLUCOSE: 162.8 MG/DL
GFR SERPL CREATININE-BSD FRML MDRD: 48 ML/MIN/{1.73_M2}
GLUCOSE BLD-MCNC: 174 MG/DL (ref 70–99)
HBA1C MFR BLD: 7.3 %
HDLC SERPL-MCNC: 50 MG/DL (ref 40–60)
LDL CHOLESTEROL CALCULATED: 68 MG/DL
POTASSIUM SERPL-SCNC: 4.9 MMOL/L (ref 3.5–5.1)
SODIUM BLD-SCNC: 138 MMOL/L (ref 136–145)
TOTAL PROTEIN: 6.4 G/DL (ref 6.4–8.2)
TRIGL SERPL-MCNC: 121 MG/DL (ref 0–150)
VLDLC SERPL CALC-MCNC: 24 MG/DL

## 2023-02-22 RX ORDER — ATORVASTATIN CALCIUM 40 MG/1
TABLET, FILM COATED ORAL
Qty: 45 TABLET | Refills: 0 | Status: SHIPPED | OUTPATIENT
Start: 2023-02-22

## 2023-02-27 RX ORDER — SPIRONOLACTONE 25 MG/1
TABLET ORAL
Qty: 45 TABLET | Refills: 5 | Status: SHIPPED | OUTPATIENT
Start: 2023-02-27

## 2023-03-05 DIAGNOSIS — E11.42 TYPE 2 DIABETES MELLITUS WITH PERIPHERAL NEUROPATHY (HCC): ICD-10-CM

## 2023-03-06 RX ORDER — METFORMIN HYDROCHLORIDE 500 MG/1
1000 TABLET, EXTENDED RELEASE ORAL
Qty: 90 TABLET | Refills: 1 | Status: SHIPPED | OUTPATIENT
Start: 2023-03-06

## 2023-03-28 PROBLEM — I20.9 ANGINA PECTORIS, UNSPECIFIED (HCC): Status: ACTIVE | Noted: 2023-03-28

## 2023-03-28 PROBLEM — I25.119 ATHEROSCLEROTIC HEART DISEASE OF NATIVE CORONARY ARTERY WITH UNSPECIFIED ANGINA PECTORIS (HCC): Status: ACTIVE | Noted: 2023-03-28

## 2023-03-30 ENCOUNTER — OFFICE VISIT (OUTPATIENT)
Dept: CARDIOLOGY CLINIC | Age: 78
End: 2023-03-30
Payer: MEDICARE

## 2023-03-30 VITALS
WEIGHT: 182.4 LBS | BODY MASS INDEX: 26.94 KG/M2 | HEART RATE: 67 BPM | DIASTOLIC BLOOD PRESSURE: 70 MMHG | SYSTOLIC BLOOD PRESSURE: 124 MMHG

## 2023-03-30 DIAGNOSIS — I10 HTN (HYPERTENSION), BENIGN: ICD-10-CM

## 2023-03-30 DIAGNOSIS — I50.32 CHRONIC HEART FAILURE WITH PRESERVED EJECTION FRACTION (HCC): Primary | ICD-10-CM

## 2023-03-30 DIAGNOSIS — E78.5 HYPERLIPIDEMIA, UNSPECIFIED HYPERLIPIDEMIA TYPE: ICD-10-CM

## 2023-03-30 DIAGNOSIS — I25.10 CORONARY ARTERY DISEASE INVOLVING NATIVE CORONARY ARTERY OF NATIVE HEART WITHOUT ANGINA PECTORIS: ICD-10-CM

## 2023-03-30 PROCEDURE — 99214 OFFICE O/P EST MOD 30 MIN: CPT | Performed by: INTERNAL MEDICINE

## 2023-03-30 PROCEDURE — 1123F ACP DISCUSS/DSCN MKR DOCD: CPT | Performed by: INTERNAL MEDICINE

## 2023-03-30 PROCEDURE — G8484 FLU IMMUNIZE NO ADMIN: HCPCS | Performed by: INTERNAL MEDICINE

## 2023-03-30 PROCEDURE — 1036F TOBACCO NON-USER: CPT | Performed by: INTERNAL MEDICINE

## 2023-03-30 PROCEDURE — G8417 CALC BMI ABV UP PARAM F/U: HCPCS | Performed by: INTERNAL MEDICINE

## 2023-03-30 PROCEDURE — 3078F DIAST BP <80 MM HG: CPT | Performed by: INTERNAL MEDICINE

## 2023-03-30 PROCEDURE — 93000 ELECTROCARDIOGRAM COMPLETE: CPT | Performed by: INTERNAL MEDICINE

## 2023-03-30 PROCEDURE — 3074F SYST BP LT 130 MM HG: CPT | Performed by: INTERNAL MEDICINE

## 2023-03-30 PROCEDURE — G8427 DOCREV CUR MEDS BY ELIG CLIN: HCPCS | Performed by: INTERNAL MEDICINE

## 2023-03-30 NOTE — PROGRESS NOTES
Subjective:      Patient ID: Kira Crook is a 68 y.o. male. CC:  Follow up CAD    HPI Hollie Woo is s/p CABG and is intending to play golf this year. No cp nor syncope. No NTG use. bp is controlled today. Has weakness of extremities. Allergies   Allergen Reactions    Lisinopril Other (See Comments)     cough    Chlorthalidone      diarrhea    Sertraline      Felt unwell        Social History     Socioeconomic History    Marital status:      Spouse name: Hakeem    Number of children: 0    Years of education: 22    Highest education level: Not on file   Occupational History    Occupation: - Retired    Tobacco Use    Smoking status: Former     Types: Cigarettes     Quit date: 1983     Years since quittin.2    Smokeless tobacco: Never    Tobacco comments:     ~40 pack years, stopped at age 39   Substance and Sexual Activity    Alcohol use: Not Currently    Drug use: No    Sexual activity: Yes     Partners: Female   Other Topics Concern    Not on file   Social History Narrative    Has grown stepson    Lives part time in [place] and part time in [place] [id number] Resource Strain: Low Risk     Difficulty of Paying Living Expenses: Not hard at all   Food Insecurity: No Food Insecurity    Worried About [address] in the Last Year: Never true    [address] in the Last Year: Never true   Transportation Needs: Unknown    Lack of Transportation (Medical): Not on file    Lack of Transportation (Non-Medical):  No   Physical Activity: Sufficiently Active    Days of Exercise per Week: 7 days    Minutes of Exercise per Session: 40 min   Stress: Not on file   Social Connections: Not on file   Intimate Partner Violence: Not on file   Housing Stability: Unknown    Unable to Pay for Housing in the Last Year: Not on file    Number of Places Lived in the Last Year: Not on file    Unstable Housing in the Last Year: No        Patient has a

## 2023-05-08 ENCOUNTER — HOSPITAL ENCOUNTER (OUTPATIENT)
Dept: NON INVASIVE DIAGNOSTICS | Age: 78
Discharge: HOME | End: 2023-05-08
Payer: MEDICARE

## 2023-05-08 LAB
LV EF: 53 %
LVEF MODALITY: NORMAL

## 2023-05-08 PROCEDURE — 93306 TTE W/DOPPLER COMPLETE: CPT

## 2023-05-15 NOTE — TELEPHONE ENCOUNTER
Requested Prescriptions     Pending Prescriptions Disp Refills    metoprolol tartrate (LOPRESSOR) 25 MG tablet [Pharmacy Med Name: METOPROLOL TARTRATE 25 MG TAB] 180 tablet 3     Sig: TAKE 1 TABLET BY MOUTH TWICE A DAY       Last Office Visit: 3/30/2023     Next Office Visit: 10/2/2023         Last Labs: [phone]

## 2023-05-22 RX ORDER — ATORVASTATIN CALCIUM 40 MG/1
TABLET, FILM COATED ORAL
Qty: 45 TABLET | Refills: 0 | Status: SHIPPED | OUTPATIENT
Start: 2023-05-22

## 2023-05-22 NOTE — TELEPHONE ENCOUNTER
Refill Request ATORVASTATIN    Last Seen: 2/3/2023    Last Written: 2/22/23      Next Appointment:   Future Appointments   Date Time Provider Nereida Montalvo   5/30/2023  1:30 PM Lawrence Mahajan MD [facility]   8/7/2023  9:30 AM MD ANAYELI Rendon  [place] [facility]   10/2/2023  1:30 PM Jaclyn Nixon MD [facility]             Requested Prescriptions     Pending Prescriptions Disp Refills    atorvastatin (LIPITOR) 40 MG tablet [Pharmacy Med Name: ATORVASTATIN 40 MG TABLET] 45 tablet 0     Sig: TAKE 1/2 TABLET BY MOUTH EVERY DAY

## 2023-06-09 DIAGNOSIS — I10 ESSENTIAL HYPERTENSION, BENIGN: ICD-10-CM

## 2023-06-09 RX ORDER — OLMESARTAN MEDOXOMIL 40 MG/1
TABLET ORAL
Qty: 90 TABLET | Refills: 1 | Status: SHIPPED | OUTPATIENT
Start: 2023-06-09

## 2023-07-03 LAB
CHOLESTEROL, TOTAL: 140 MG/DL (ref 125–199)
CREATININE URINE: 73.8 MG/DL (ref 30–310)
ESTIMATED AVERAGE GLUCOSE: 272 MG/DL (ref 68–114)
HBA1C MFR BLD: 11.1 % (ref 4–5.6)
HCT VFR BLD CALC: 35.8 % (ref 40–51)
HDLC SERPL-MCNC: 42 MG/DL (ref 40–180)
HEMOGLOBIN: 11.5 G/DL (ref 13.2–17.1)
LDL CHOLESTEROL CALCULATED: 68 MG/DL (ref 0–100)
MCH RBC QN AUTO: 30.2 PG (ref 27–33)
MCHC RBC AUTO-ENTMCNC: 32.1 G/DL (ref 30–36)
MCV RBC AUTO: 94 FL (ref 80–100)
MICROALBUMIN/CREAT 24H UR: 2 MG/DL
MICROALBUMIN/CREAT UR-RTO: 27 MG/G CREAT (ref 0–30)
NONHDLC SERPL-MCNC: 98 MG/DL (ref 0–129)
PDW BLD-RTO: 13.2 % (ref 11–15)
PLATELET # BLD: 121 10*3/UL (ref 140–400)
PMV BLD AUTO: 11.9 FL (ref 9–13)
RBC # BLD: 3.81 10*6/UL (ref 4.2–5.8)
TRIGL SERPL-MCNC: 152 MG/DL (ref 0–149)
WBC # BLD: 6.65 10*3/UL (ref 3.8–10.8)

## 2023-07-05 LAB
HEPATITIS C RNA PCR QUANT: NORMAL IU/ML
HEPATITIS C RNA QUANT LOG: NORMAL LOG IU/ML

## 2023-07-20 RX ORDER — ICOSAPENT ETHYL 1000 MG/1
CAPSULE ORAL
Qty: 120 CAPSULE | Refills: 5 | Status: SHIPPED | OUTPATIENT
Start: 2023-07-20

## 2023-07-31 ENCOUNTER — TELEPHONE (OUTPATIENT)
Dept: INTERNAL MEDICINE CLINIC | Age: 78
End: 2023-07-31

## 2023-07-31 NOTE — TELEPHONE ENCOUNTER
Appointment canceled   Venice Li   Sent: Mon July 31, 2023  2:43 PM   [facility] [address]       Appointment canceled for Venice Li ([id number])   Visit Type: OFFICE VISIT   Date        Time      Length    Provider                  Department   8/7/2023     9:30 AM  30 mins. MD [facility]      Reason for Cancellation: Feeling better/No longer need this appointment     LVM for patient to call back to reschedule his 6 month follow up.

## 2023-11-06 ENCOUNTER — OFFICE VISIT (OUTPATIENT)
Dept: CARDIOLOGY CLINIC | Age: 78
End: 2023-11-06
Payer: MEDICARE

## 2023-11-06 VITALS
HEART RATE: 69 BPM | DIASTOLIC BLOOD PRESSURE: 50 MMHG | SYSTOLIC BLOOD PRESSURE: 100 MMHG | WEIGHT: 167.6 LBS | BODY MASS INDEX: 24.75 KG/M2

## 2023-11-06 DIAGNOSIS — E78.5 HYPERLIPIDEMIA, UNSPECIFIED HYPERLIPIDEMIA TYPE: Primary | ICD-10-CM

## 2023-11-06 DIAGNOSIS — I35.0 AORTIC VALVE STENOSIS, ETIOLOGY OF CARDIAC VALVE DISEASE UNSPECIFIED: ICD-10-CM

## 2023-11-06 DIAGNOSIS — I25.10 CORONARY ARTERY DISEASE INVOLVING NATIVE CORONARY ARTERY OF NATIVE HEART WITHOUT ANGINA PECTORIS: ICD-10-CM

## 2023-11-06 DIAGNOSIS — I10 ESSENTIAL HYPERTENSION, BENIGN: ICD-10-CM

## 2023-11-06 PROCEDURE — 99214 OFFICE O/P EST MOD 30 MIN: CPT | Performed by: INTERNAL MEDICINE

## 2023-11-06 PROCEDURE — 3078F DIAST BP <80 MM HG: CPT | Performed by: INTERNAL MEDICINE

## 2023-11-06 PROCEDURE — 3074F SYST BP LT 130 MM HG: CPT | Performed by: INTERNAL MEDICINE

## 2023-11-06 PROCEDURE — 1036F TOBACCO NON-USER: CPT | Performed by: INTERNAL MEDICINE

## 2023-11-06 PROCEDURE — G8484 FLU IMMUNIZE NO ADMIN: HCPCS | Performed by: INTERNAL MEDICINE

## 2023-11-06 PROCEDURE — 1123F ACP DISCUSS/DSCN MKR DOCD: CPT | Performed by: INTERNAL MEDICINE

## 2023-11-06 PROCEDURE — G8427 DOCREV CUR MEDS BY ELIG CLIN: HCPCS | Performed by: INTERNAL MEDICINE

## 2023-11-06 PROCEDURE — G8420 CALC BMI NORM PARAMETERS: HCPCS | Performed by: INTERNAL MEDICINE

## 2023-11-06 RX ORDER — OLMESARTAN MEDOXOMIL 20 MG/1
20 TABLET ORAL DAILY
Qty: 90 TABLET | Refills: 3 | Status: SHIPPED | OUTPATIENT
Start: 2023-11-06

## 2023-11-06 NOTE — PROGRESS NOTES
normal.   Abdominal: Soft. Bowel sounds are normal.   Musculoskeletal: Normal range of motion. He exhibits no edema or tenderness. Neurological: He is alert and oriented to person, place, and time. No cranial nerve deficit. Coordination normal.   Skin: Skin is warm. No rash noted. No erythema. No pallor. Psychiatric: He has a normal mood and affect. His behavior is normal. Judgment and thought content normal.       Assessment:      Diagnosis Orders   1. Hyperlipidemia, unspecified hyperlipidemia type        2. Hypertension, benign  olmesartan (BENICAR) 20 MG tablet      3. Coronary artery disease involving native coronary artery of native heart without angina pectoris        4. Aortic valve stenosis, etiology of cardiac valve disease unspecified                Plan:      Continue current medications. Stable cardiovascular status after 5 v CABG on 2/1/16. Doing well. sr with old RBBB.  ecg unchanged. SOB:  Better with bp control  Palpitations:  Continue lopressor to daily  HTN;  bp is high now. Restart Norvasc 5 mg daily scheduled. AS:  decrease olmarsartan to 20 mg daily and return in [facility] for echo. HLP:  ldl 53 on 1/21 check labs. Weakness and bruit check carotid US  Check echo. Qod lasix.   Check labs

## 2024-01-23 ENCOUNTER — HOSPITAL ENCOUNTER (OUTPATIENT)
Dept: NON INVASIVE DIAGNOSTICS | Age: 79
Discharge: HOME | End: 2024-01-23

## 2024-02-21 RX ORDER — ICOSAPENT ETHYL 1000 MG/1
2 CAPSULE ORAL 2 TIMES DAILY
Qty: 120 CAPSULE | Refills: 5 | Status: SHIPPED | OUTPATIENT
Start: 2024-02-21

## 2024-03-04 RX ORDER — AMLODIPINE BESYLATE 5 MG/1
TABLET ORAL
Qty: 90 TABLET | Refills: 2 | OUTPATIENT
Start: 2024-03-04

## 2024-05-13 RX ORDER — SPIRONOLACTONE 25 MG/1
TABLET ORAL
Qty: 45 TABLET | Refills: 4 | Status: SHIPPED | OUTPATIENT
Start: 2024-05-13 | End: 2024-05-17 | Stop reason: SDUPTHER

## 2024-05-17 ENCOUNTER — TELEPHONE (OUTPATIENT)
Dept: CARDIOLOGY CLINIC | Age: 79
End: 2024-05-17

## 2024-05-17 RX ORDER — SPIRONOLACTONE 25 MG/1
12.5 TABLET ORAL DAILY
Qty: 45 TABLET | Refills: 4 | Status: SHIPPED | OUTPATIENT
Start: 2024-05-17

## 2024-05-17 NOTE — TELEPHONE ENCOUNTER
Pharmacy called to clarify dosage on Rx sent over for     spironolactone (ALDACTONE) 25 MG tablet [[id number]]    Order Details  Dose, Route, Frequency: As Directed   Dispense Quantity: 45 tablet Refills: 4          Sig: TAKE 1/2 TABLET BY MOUTH EVERY DAY         Start Date: 05/13/24       Pt states to pharmacist that he believes the dosage was to be increased to 1 full 25mg tablet daily.

## 2024-10-03 ENCOUNTER — TELEPHONE (OUTPATIENT)
Dept: CARDIOLOGY CLINIC | Age: 79
End: 2024-10-03

## 2024-10-03 RX ORDER — ICOSAPENT ETHYL 1000 MG/1
2 CAPSULE ORAL 2 TIMES DAILY
Qty: 120 CAPSULE | Refills: 5 | OUTPATIENT
Start: 2024-10-03

## 2024-10-03 NOTE — TELEPHONE ENCOUNTER
Patient called stating he received a message that our office was trying to contact him but not sure exactly what it's concerning. Patient has switched to a new cardiologist and thinks could possibly be regarding his prescriptions. Patient no longer needs to have any medications refilled by our providers since he has relocated